# Patient Record
Sex: FEMALE | Race: BLACK OR AFRICAN AMERICAN | Employment: UNEMPLOYED | URBAN - METROPOLITAN AREA
[De-identification: names, ages, dates, MRNs, and addresses within clinical notes are randomized per-mention and may not be internally consistent; named-entity substitution may affect disease eponyms.]

---

## 2017-05-01 ENCOUNTER — HOSPITAL ENCOUNTER (OUTPATIENT)
Age: 47
Setting detail: SPECIMEN
Discharge: HOME | End: 2017-05-01
Payer: COMMERCIAL

## 2017-05-02 LAB
ESTIMATED AVERAGE GLUCOSE: 214 MG/DL
HBA1C MFR BLD: 9.1 % (ref 4–6)

## 2018-07-20 ENCOUNTER — HOSPITAL ENCOUNTER (OUTPATIENT)
Dept: DIABETES SERVICES | Age: 48
Setting detail: THERAPIES SERIES
Discharge: HOME | End: 2018-07-20
Payer: COMMERCIAL

## 2018-07-20 DIAGNOSIS — E11.00 TYPE 2 DIABETES MELLITUS WITH HYPEROSMOLARITY WITHOUT COMA, UNSPECIFIED LONG TERM INSULIN USE STATUS: Primary | ICD-10-CM

## 2018-07-20 PROCEDURE — G0108 DIAB MANAGE TRN  PER INDIV: HCPCS

## 2018-07-20 RX ORDER — DIPHENHYDRAMINE HYDROCHLORIDE 25 MG/1
1 CAPSULE, LIQUID FILLED ORAL 3 TIMES DAILY
COMMUNITY

## 2018-07-20 RX ORDER — LISINOPRIL 5 MG/1
10 TABLET ORAL DAILY
COMMUNITY

## 2018-07-20 RX ORDER — ATORVASTATIN CALCIUM 40 MG/1
40 TABLET, FILM COATED ORAL EVERY EVENING
COMMUNITY

## 2018-07-20 NOTE — PROGRESS NOTES
Diabetes Self- Management Education Program Assessment -   Also see Diabetic Screening  Patient, Lewis Trinh,  here for diabetes self-management education  visit/ assessment. Today's visit was in an individual setting. Diet History :  Diet Questionnaire and typical meal /portion sheet completed  []Yes  [x] NO -- due to time patient did not give diet history    Goals setting:  Goal work sheet completed  [x]Yes  [] NO  (SEE  EDUCATION AND GOALS FLOWSHEET)    MEDICAL HISTORY:  No past medical history on file. No family history on file. Patient has no allergy information on record. There is no immunization history on file for this patient. Current Medications  Current Outpatient Prescriptions   Medication Sig Dispense Refill    insulin lispro (HUMALOG) 100 UNIT/ML injection vial Inject 2-12 Units into the skin 3 times daily (before meals)      insulin glargine (BASAGLAR KWIKPEN) 100 UNIT/ML injection pen Inject 28 Units into the skin nightly      atorvastatin (LIPITOR) 40 MG tablet Take 40 mg by mouth daily      lisinopril (PRINIVIL;ZESTRIL) 5 MG tablet Take 5 mg by mouth daily      aspirin 81 MG tablet Take 81 mg by mouth daily      Blood Glucose Monitoring Suppl (BLOOD GLUCOSE MONITOR SYSTEM) w/Device KIT 1 Device by Does not apply route 3 times daily      metFORMIN (GLUCOPHAGE) 500 MG tablet Take 500 mg by mouth 2 times daily (with meals)       No current facility-administered medications for this encounter.    :     Comments:  Allergies:   Allergies not on file  Diabetes 5  / Health Status    A1C blood level - at goal < 7%   Lab Results   Component Value Date    LABA1C 9.1 (H) 05/01/2017    LABA1C 7.5 (H) 10/10/2016    LABA1C 7.8 (H) 01/11/2016     Lab Results   Component Value Date    LABMICR 10 10/10/2016    CREATININE 0.58 10/10/2016       Blood pressure ( 130/ 80)  Or less  BP Readings from Last 3 Encounters:   No data found for BP        Cholesterol ( LDL under  100)   Lab Results Needs to see eye care and needs to have dental appt    Follows with [facility]    Stated she also has follow up in Aug with Chad Martines of name - female    Developing strategies to address psychosocial issues:  Describe feelings about living with diabetes; Describe how stress, depression & anxiety affect blood glucose; Identify coping strategies; Identify support needed & support network available. 1      Very stressed and feeling overwhelmed    Developing strategies to promote health/change behavior: Identify 7 self-care behaviors; Personal health risk factors; Benefits, challenges & strategies for behavioral change;    1         Individualized goal selection. My goal , to help me improve my health, I will:  Healthy eating  1. Use healthy plate for meal planning     Medications  2. Take insulins as directed        Plan  Follow-up Appointments planned in individual setting. Next Appointment in 2 weeks. Instruction Method: [x]Lecture/Discussion  []Power Point Presentation  []Handouts  []Return Demonstration      Education Materials/Equipment Provided:    [x]Self-Management - Initial assessment - Enrolment in to ADA  Where do I Begin, Living with Type 2 diabetes ADA home support program and handout for no concentrated sweets and diet meal planning basics, handout on diabetes education classes.  -7/20/18 rs -      []Self-Management  Class 1 - [facility] Well with Diabetes Booklet and Healthy i On the Road to better managing your diabetes map handouts    [] Self-Management  Class 2 - Meal Plan and handout for serving sizes, smarter snacking, Ready Set Carb Counting / Plate Method, Nutrient Conversion and International [address] Eating for People with Diabetes and Nutrition in the [employer] - fast facts about fast food    [] Self-Management  Class 3 -  Diabetes ID card,  foot care tips sheet, Healthy I  Continuing Your Journey with Diabetes map handout, Individualized Diabetes report card    [] Self-Management Class 4 - BD Booklet  Sick Day Rules and  Dinning Out Guide , recipe hand outs and tips, diabetes Cookbooks  ( when available)     []Self-Management - 3 month follow - up  AADE booklet Side by Ajith Sim a partnership approach to diabetes self- care, [address] [place] [address] Dr Tobacco Quit line, [facility] Diabetes support program information sheet, [facility] information sheet       []Self-Management  Gestational - RN class -Resource materials provided today include educational self care booklet - \" Gestational Diabetes - A Healthy pregnancy and beyond\"  with SMGB and 3 small meals and 3 small snacks diet plan. SMBG sheets to fax back to [facility] weekly.  [facility] guidelines for SMGB and blood glucose log sheets, Never too early to prevent diabetes handout from [facility]      []Self-Management Gestational - RD class - My Food Plan for Gestational diabetes    []Glucose Meter     []Insulin Kit     []Other      Encounter Type Date Start Time End Time Comments No Show Dates   Assessment 7/20/18 rs      1 30   2 27    [x]In Person  []Telephone    Class 1 - Understanding diabetes         Class 2- Nutrition and diabetes          Class 3 - Preventing Complications         Class 4 -  In depth Nutrition and sick day care        Class 5 - 3 month follow up / goal reassessment        Gestational - RN         Gestational - RD        Individual MNT         Shared Med Appt         Yearly Follow-up        Meter Instrx        Insulin Instrx      []Pen  []Vial & Syringe      DSMS Support Plan:  Follow-up plan:     [] MNT referral request / Appointment     [] Annual update referral request and appointment  after      [x]ADA  Where do I Begin, Living with Type 2 diabetes ADA home support program-7/20/18 rs -     [] Healthy U - Diabetes workshops via [facility]       [] Starting [employer] program /  [employer] [phone]    []  Support Group / Mayra Pinto 6 week

## 2019-09-05 ENCOUNTER — HOSPITAL ENCOUNTER (OUTPATIENT)
Age: 49
Setting detail: SPECIMEN
Discharge: HOME | End: 2019-09-05
Payer: COMMERCIAL

## 2019-09-06 LAB
DIRECT EXAM: ABNORMAL
Lab: ABNORMAL
SPECIMEN DESCRIPTION: ABNORMAL

## 2019-09-10 LAB
HPV SAMPLE: NORMAL
HPV, GENOTYPE 16: NOT DETECTED
HPV, GENOTYPE 18: NOT DETECTED
HPV, HIGH RISK OTHER: NOT DETECTED
HPV, INTERPRETATION: NORMAL
SPECIMEN DESCRIPTION: NORMAL

## 2019-09-16 LAB — CYTOLOGY REPORT: NORMAL

## 2020-10-28 ENCOUNTER — OFFICE VISIT (OUTPATIENT)
Dept: PODIATRY | Age: 50
End: 2020-10-28
Payer: COMMERCIAL

## 2020-10-28 VITALS — RESPIRATION RATE: 16 BRPM | BODY MASS INDEX: 33.91 KG/M2 | TEMPERATURE: 97.3 F | HEIGHT: 66 IN | WEIGHT: 211 LBS

## 2020-10-28 PROCEDURE — G8417 CALC BMI ABV UP PARAM F/U: HCPCS | Performed by: PODIATRIST

## 2020-10-28 PROCEDURE — 2022F DILAT RTA XM EVC RTNOPTHY: CPT | Performed by: PODIATRIST

## 2020-10-28 PROCEDURE — 4004F PT TOBACCO SCREEN RCVD TLK: CPT | Performed by: PODIATRIST

## 2020-10-28 PROCEDURE — G8484 FLU IMMUNIZE NO ADMIN: HCPCS | Performed by: PODIATRIST

## 2020-10-28 PROCEDURE — 99203 OFFICE O/P NEW LOW 30 MIN: CPT | Performed by: PODIATRIST

## 2020-10-28 PROCEDURE — 17110 DESTRUCTION B9 LES UP TO 14: CPT | Performed by: PODIATRIST

## 2020-10-28 PROCEDURE — 11721 DEBRIDE NAIL 6 OR MORE: CPT | Performed by: PODIATRIST

## 2020-10-28 PROCEDURE — G8428 CUR MEDS NOT DOCUMENT: HCPCS | Performed by: PODIATRIST

## 2020-10-28 PROCEDURE — 3017F COLORECTAL CA SCREEN DOC REV: CPT | Performed by: PODIATRIST

## 2020-10-28 PROCEDURE — 3046F HEMOGLOBIN A1C LEVEL >9.0%: CPT | Performed by: PODIATRIST

## 2020-10-28 RX ORDER — AMMONIUM LACTATE 12 G/100G
CREAM TOPICAL
Qty: 1 BOTTLE | Refills: 4 | Status: SHIPPED | OUTPATIENT
Start: 2020-10-28 | End: 2021-01-13 | Stop reason: SDUPTHER

## 2020-10-28 NOTE — PROGRESS NOTES
[facility] PHYSICIANS  [facility]  [address] [place] [address]  Dept: [phone]  Dept Fax: [phone]    NEW PATIENT PROGRESS NOTE  Date of patient's visit: 10/28/2020  Patient's Name:  Zaid Agosto YOB: 1970            Patient Care Team:  Samara Villalobos MD as PCP - General (Internal Medicine)  Migel Bear DPM as Physician (Podiatry)        Chief Complaint   Patient presents with    New Patient    Foot Pain    Diabetes         HPI:   Zaid Agosto is a 44 y.o. female who presents to the office today complaining of painful toes and incresaed thickened nails that she cannot trim herself. Symptoms began 1 year(s) ago. She also relates to increased painful skin lesion to her right foot. Patient relates pain is Present. Pain is rated 10 out of 10 and is described as intermittent. Treatments prior to today's visit include: none. Currently denies F/C/N/V. Pt's primary care physician is Samara Villalobos MD last seen      Allergies   Allergen Reactions    No Known Allergies        No past medical history on file. Prior to Admission medications    Medication Sig Start Date End Date Taking?  Authorizing Provider   metFORMIN (GLUCOPHAGE) 500 MG tablet Take 500 mg by mouth 2 times daily (with meals)    Historical Provider, MD   insulin lispro (HUMALOG) 100 UNIT/ML injection vial Inject 2-12 Units into the skin 3 times daily (before meals)    Historical Provider, MD   insulin glargine (BASAGLAR KWIKPEN) 100 UNIT/ML injection pen Inject 28 Units into the skin nightly    Historical Provider, MD   atorvastatin (LIPITOR) 40 MG tablet Take 40 mg by mouth daily    Historical Provider, MD   lisinopril (PRINIVIL;ZESTRIL) 5 MG tablet Take 5 mg by mouth daily    Historical Provider, MD   aspirin 81 MG tablet Take 81 mg by mouth daily    Historical Provider, MD   Blood Glucose Monitoring Suppl (BLOOD GLUCOSE MONITOR SYSTEM) w/Device KIT 1 Device by Does not apply route 3 times daily    Historical Provider, MD       No past surgical history on file. No family history on file. Social History     Tobacco Use    Smoking status: Current Every Day Smoker     Packs/day: 0.30     Types: Cigarettes     Start date: 7/20/1987    Smokeless tobacco: Never Used   Substance Use Topics    Alcohol use: Yes     Alcohol/week: 4.0 standard drinks     Types: 4 Cans of beer per week       Review of Systems    Review of Systems:   History obtained from chart review and the patient  General ROS: negative for - chills, fatigue, fever, night sweats or weight gain  Constitutional: Negative for chills, diaphoresis, fatigue, fever and unexpected weight change. Musculoskeletal: Positive for arthralgias, gait problem and joint swelling. Neurological ROS: negative for - behavioral changes, confusion, headaches or seizures. Negative for weakness and numbness. Dermatological ROS: negative for - mole changes, rash  Cardiovascular: Negative for leg swelling. Gastrointestinal: Negative for constipation, diarrhea, nausea and vomiting. Lower Extremity Physical Examination:   Vitals:   Vitals:    10/28/20 1328   Temp: 97.3 °F (36.3 °C)     General: AAO x 3 in NAD. Dermatologic Exam:  Soft tissue lesion to the plantar right foot x1 with central core and petechiae. Pain on palpation of lesion. Skin No rashes or nodules noted. .   Skin is thin, with flaky sloughing skin as well as decreased hair growth to the lower leg  Small red hemosiderin deposits seen dorsal foot   Musculoskeletal:     1st MPJ ROM decreased, Bilateral.  Muscle strength 5/5, Bilateral.  Pain present upon palpation of toenails 1-5, Bilateral. decreased medial longitudinal arch, Bilateral.  Ankle ROM decreased,Bilateral.    Dorsally contracted digits present digits 2, Bilateral.     Vascular: DP pulses 1/4 bilateral.  PT pulses 0/4 bilateral.   CFT <5 seconds, Bilateral.  Hair growth absent to the level of the digits, Bilateral. Edema present, Bilateral.  Varicosities absent, Bilateral. Erythema absent, Bilateral    Neurological: Sensation diminshed to light touch to level of digits, Bilateral.  Protective sensation intact 6/10 sites via 5.07/10g [place]-Cynthia Monofilament, Bilateral.  negative Tinel's, Bilateral.  negative Valleix sign, Bilateral.      Integument: Warm, dry, supple, Bilateral.  Open lesion absent, Bilateral.  Interdigital maceration absent to web spaces 4, Bilateral.  Nails 1-5 left and 1-5 right thickened > 3.0 mm, dystrophic and crumbly, discolored with subungual debris. Fissures absent, Bilateral.       Asessment: Patient is a 44 y.o. female with:    Diagnosis Orders   1. Type II diabetes mellitus with peripheral circulatory disorder (HCC)   DIABETES FOOT EXAM    Misc. Devices MISC    33619 - LA DEBRIDEMENT OF NAILS, 6 OR MORE   2. Pain in both lower extremities   DIABETES FOOT EXAM    Misc. Devices MISC    33073 - LA DEBRIDEMENT OF NAILS, 6 OR MORE    16428 - LA DESTRUCTION BENIGN LESIONS UP TO 14   3. Dermatophytosis of nail  66658 - LA DEBRIDEMENT OF NAILS, 6 OR MORE   4. Benign neoplasm of skin of right foot  94747 - LA DESTRUCTION BENIGN LESIONS UP TO 14         Plan: Patient examined and evaluated. Current condition and treatment options discussed in detail. Discussed conservative and surgical options with the patient. Sharp debridement of nails 1-5, loida with nail nipper and drummel. The lesion was partially excised and Pyrogallic acid was applied under occlusion. The patient will leave in place for 24-48 hours and than remove. The patient tolerated the procedure well and without complication. RX: DM shoes with inserts, lachydrin, and penlac. Verbal and written instructions given to patient. Contact office with any questions/problems/concerns. RTC in 2month(s).     10/28/2020    Electronically signed by Marily Mary DPM on 10/28/2020 at 1:32 PM  10/28/2020

## 2021-01-13 ENCOUNTER — OFFICE VISIT (OUTPATIENT)
Dept: PODIATRY | Age: 51
End: 2021-01-13
Payer: COMMERCIAL

## 2021-01-13 VITALS — TEMPERATURE: 97.4 F | WEIGHT: 212 LBS | HEIGHT: 66 IN | RESPIRATION RATE: 16 BRPM | BODY MASS INDEX: 34.07 KG/M2

## 2021-01-13 DIAGNOSIS — B35.1 DERMATOPHYTOSIS OF NAIL: ICD-10-CM

## 2021-01-13 DIAGNOSIS — L60.0 INGROWN NAIL: ICD-10-CM

## 2021-01-13 DIAGNOSIS — M79.604 PAIN IN BOTH LOWER EXTREMITIES: ICD-10-CM

## 2021-01-13 DIAGNOSIS — B35.3 TINEA PEDIS OF BOTH FEET: ICD-10-CM

## 2021-01-13 DIAGNOSIS — M79.605 PAIN IN BOTH LOWER EXTREMITIES: ICD-10-CM

## 2021-01-13 DIAGNOSIS — E11.51 TYPE II DIABETES MELLITUS WITH PERIPHERAL CIRCULATORY DISORDER (HCC): Primary | ICD-10-CM

## 2021-01-13 PROCEDURE — 3017F COLORECTAL CA SCREEN DOC REV: CPT | Performed by: PODIATRIST

## 2021-01-13 PROCEDURE — 4004F PT TOBACCO SCREEN RCVD TLK: CPT | Performed by: PODIATRIST

## 2021-01-13 PROCEDURE — 3046F HEMOGLOBIN A1C LEVEL >9.0%: CPT | Performed by: PODIATRIST

## 2021-01-13 PROCEDURE — G8428 CUR MEDS NOT DOCUMENT: HCPCS | Performed by: PODIATRIST

## 2021-01-13 PROCEDURE — G8484 FLU IMMUNIZE NO ADMIN: HCPCS | Performed by: PODIATRIST

## 2021-01-13 PROCEDURE — 99213 OFFICE O/P EST LOW 20 MIN: CPT | Performed by: PODIATRIST

## 2021-01-13 PROCEDURE — G8417 CALC BMI ABV UP PARAM F/U: HCPCS | Performed by: PODIATRIST

## 2021-01-13 PROCEDURE — 11721 DEBRIDE NAIL 6 OR MORE: CPT | Performed by: PODIATRIST

## 2021-01-13 PROCEDURE — 2022F DILAT RTA XM EVC RTNOPTHY: CPT | Performed by: PODIATRIST

## 2021-01-13 RX ORDER — AMMONIUM LACTATE 12 G/100G
CREAM TOPICAL
Qty: 1 BOTTLE | Refills: 4 | Status: SHIPPED | OUTPATIENT
Start: 2021-01-13

## 2021-01-13 NOTE — PROGRESS NOTES
[facility] PHYSICIANS  [facility]  [address] [place] [address]  Dept: [phone]  Dept Fax: [phone]    DIABETIC PROGRESS NOTE  Date of patient's visit: 1/13/2021  Patient's Name:  Simon Redman YOB: 1970            Patient Care Team:  Bharat Thomson MD as PCP - General (Internal Medicine)  Anant Samayoa DPM as Physician (Podiatry)          Chief Complaint   Patient presents with    Diabetes    Foot Pain    Nail Problem       Subjective:   Simon Redman comes to clinic for Diabetes, Foot Pain, and Nail Problem    she is a diabetic and states that  She checks sugar regularly. Pt currently has complaint of thickened, elongated nails that they cannot manage by themselves. Pt's primary care physician is Bharat Thomson MD last seen 4/28/20   Pt's last blood sugar was  150 . Pt has a new complaint of increased dry itchy skin to loida feet. Also relates to increased painful ingrown nail to right great toe. Pt has tried changing shoes but it has not helped the pain       Lab Results   Component Value Date    LABA1C 9.1 (H) 05/01/2017      Complains of numbness in the feet bilat. No past medical history on file. Allergies   Allergen Reactions    No Known Allergies      Current Outpatient Medications on File Prior to Visit   Medication Sig Dispense Refill    Misc. Devices MISC 1 PAIR OF DIABETIC SHOES (1 LEFT/ 1 RIGHT)  1-3 PAIRS OF INSERTS (LEFT/ RIGHT) 2 Device 0    ammonium lactate (LAC-HYDRIN) 12 % cream Apply topically to feet once daily 1 Bottle 4    ciclopirox (PENLAC) 8 % solution Apply topically nightly. Remove once weekly with alcohol or nail polish remover.  1 Bottle 3    metFORMIN (GLUCOPHAGE) 500 MG tablet Take 500 mg by mouth 2 times daily (with meals)      insulin lispro (HUMALOG) 100 UNIT/ML injection vial Inject 2-12 Units into the skin 3 times daily (before meals)      insulin glargine (BASAGLAR KWIKPEN) 100 UNIT/ML injection pen Inject 28 level of digits, Bilateral.  Protective sensation intact 6/10 sites via 5.07/10g [place]-Cynthia Monofilament, Bilateral.  negative Tinel's, Bilateral.  negative Valleix sign, Bilateral.      Integument: Warm, dry, supple, Bilateral.  Open lesion absent, Bilateral.  Interdigital maceration absent to web spaces 4, Bilateral.  Nails 1-5 left and 1-5 right thickened > 3.0 mm, dystrophic and crumbly, discolored with subungual debris. Fissures absent, Bilateral.   General: AAO x 3 in NAD. Derm  Toenail Description  Sites of Onychomycosis Involvement (Check affected area)  [x] [x] [x] [x] [x] [x] [x] [x] [x] [x]  5 4 3 2 1 1 2 3 4 5                          Right                                        Left    Thickness  [x] [x] [x] [x] [x] [x] [x] [x] [x] [x]  5 4 3 2 1 1 2 3 4 5                         Right                                        Left    Dystrophic Changes   [x] [x] [x] [x] [x] [x] [x] [x] [x] [x]  5 4 3 2 1 1 2 3 4 5                         Right                                        Left    Color   [x] [x] [x] [x] [x] [x] [x] [x] [x] [x]  5 4 3 2 1 1 2 3 4 5                          Right                                        Left    Incurvation/Ingrowin   [] [] [] [] [] [] [] [] [] []  5 4 3 2 1 1 2 3 4 5                         Right                                        Left    Inflammation/Pain   [x] [x] [x] [x] [x] [x] [x] [x] [x] [x]  5 4 3 2 1 1 2 3 4 5                         Right                                        Left        Visual inspection:  Deformity: hammertoe deformity loida feet  amputation: absent  Skin lesions: absent  Edema: right- 2+ pitting edema, left- 2+ pitting edema    Sensory exam:  Monofilament sensation: abnormal - 6/10 via SW 5.07/10g monofilament to the plantar foot bilateral feet    Pulses: abnormal - 1/4 dorsalis pedis pulse and 1/4 Posterior tibial pulse,   Pinprick: Impaired  Proprioception: Impaired  Vibration (128 Hz):  Impaired       DM with PVD [x]Yes    []No      Assessment:  44 y.o. female with:   Diagnosis Orders   1. Type II diabetes mellitus with peripheral circulatory disorder (HCC)  HM DIABETES FOOT EXAM    24222 - ND DEBRIDEMENT OF NAILS, 6 OR MORE   2. Pain in both lower extremities  HM DIABETES FOOT EXAM    65823 - ND DESTRUCTION BENIGN LESIONS UP TO 14    05097 - ND DEBRIDEMENT OF NAILS, 6 OR MORE   3. Dermatophytosis of nail  HM DIABETES FOOT EXAM    17720 - ND DEBRIDEMENT OF NAILS, 6 OR MORE   4. Tinea pedis of both feet     5. Ingrown nail           Q7   []Yes    []No                Q8   [x]Yes    []No                     Q9   []Yes    []No    Plan:   Pt was evaluated and examined. Patient was given personalized discharge instructions. Slant back procedure perform on nail border using nail nipper to patients tolerance. Advised pt to consider nail avulsion at next visit if symptoms persist or worsen. Pt to monitor for increased signs of infection such as erythema, edema and drainage. RX: Lotrisone cream to be applied to treat tinea, loida feet. Nails 1-10 were debrided sharply in length and thickness with a nipper and , without incident. Pt will follow up in 9 weeks or sooner if any problems arise. Diagnosis was discussed with the pt and all of their questions were answered in detail. Proper foot hygiene and care was discussed with the pt. Informed patient on proper diabetic foot care and importance of tight glycemic control. Patient to check feet daily and contact the office with any questions/problems/concerns.    Other comorbidity noted and will be managed by PCP.  1/13/2021    Electronically signed by Dimitrios Mendez DPM on 1/13/2021 at 1:23 PM  1/13/2021

## 2021-03-04 ENCOUNTER — OFFICE VISIT (OUTPATIENT)
Dept: PODIATRY | Age: 51
End: 2021-03-04
Payer: COMMERCIAL

## 2021-03-04 VITALS — TEMPERATURE: 97.3 F | HEIGHT: 64 IN | BODY MASS INDEX: 36.02 KG/M2 | RESPIRATION RATE: 16 BRPM | WEIGHT: 211 LBS

## 2021-03-04 DIAGNOSIS — D23.72 BENIGN NEOPLASM OF SKIN OF LOWER LIMB, INCLUDING HIP, LEFT: ICD-10-CM

## 2021-03-04 DIAGNOSIS — M79.604 PAIN IN BOTH LOWER EXTREMITIES: ICD-10-CM

## 2021-03-04 DIAGNOSIS — B35.1 DERMATOPHYTOSIS OF NAIL: ICD-10-CM

## 2021-03-04 DIAGNOSIS — D23.71 BENIGN NEOPLASM OF SKIN OF LOWER LIMB, INCLUDING HIP, RIGHT: ICD-10-CM

## 2021-03-04 DIAGNOSIS — E11.51 TYPE II DIABETES MELLITUS WITH PERIPHERAL CIRCULATORY DISORDER (HCC): Primary | ICD-10-CM

## 2021-03-04 DIAGNOSIS — M79.605 PAIN IN BOTH LOWER EXTREMITIES: ICD-10-CM

## 2021-03-04 PROCEDURE — 11721 DEBRIDE NAIL 6 OR MORE: CPT | Performed by: PODIATRIST

## 2021-03-04 PROCEDURE — 17110 DESTRUCTION B9 LES UP TO 14: CPT | Performed by: PODIATRIST

## 2021-03-04 RX ORDER — IBUPROFEN 800 MG/1
TABLET ORAL
COMMUNITY
Start: 2021-02-11

## 2021-03-04 RX ORDER — CALCIUM CITRATE/VITAMIN D3 200MG-6.25
TABLET ORAL
COMMUNITY
Start: 2021-03-02

## 2021-03-04 RX ORDER — ASPIRIN 81 MG/1
TABLET ORAL
COMMUNITY
Start: 2021-02-11

## 2021-03-04 RX ORDER — ESTRADIOL AND NORETHINDRONE ACETATE .5; .1 MG/1; MG/1
TABLET ORAL
COMMUNITY
Start: 2021-02-11

## 2021-03-04 RX ORDER — CHOLECALCIFEROL (VITAMIN D3) 1250 MCG
CAPSULE ORAL
COMMUNITY
Start: 2021-02-25

## 2021-03-04 RX ORDER — DULAGLUTIDE 0.75 MG/.5ML
0.75 INJECTION, SOLUTION SUBCUTANEOUS WEEKLY
COMMUNITY

## 2021-03-04 NOTE — PROGRESS NOTES
[facility] PHYSICIANS  [facility]  [address]  Dept: [phone]  Dept Fax: [phone]     FOOT PAIN & BENIGN NEOPLASM PROGRESS NOTE  Date of patient's visit: 3/4/2021  Patient's Name:  Saulo Rdz YOB: 1970            Patient Care Team:  Bertha Grady MD as PCP - General (Internal Medicine)  Dimitrios Mendez DPM as Physician (Podiatry)  Marlin Mendoza DPM as Physician (Podiatry)          Chief Complaint   Patient presents with    Diabetes    Peripheral Neuropathy    Foot Pain    Benign Neoplasm    Toe Pain    Wound Check       Subjective: This Saulo Rdz comes to clinic for foot and nail care. Pt currently has complaint of thickened, painful, elongated nails that he/she cannot manage by themselves. Pt. Relates pain to nails with shoe gear. Pt's primary care physician is [address] [place] [address], [address] [place] [address] seen 1/24/2021. No past medical history on file. Saulo Rdz is a 44 y.o. female. Painful lesions her   feet. . They have been present for 2 month(s) duration. The lesions are present on the bilateral foot. The patient has 2 lesion that is deep seated and has a painful core. Treatment has included pads and previous podiatry treatment  Pt has a new complaint of NONE.      Allergies   Allergen Reactions    No Known Allergies      Current Outpatient Medications on File Prior to Visit   Medication Sig Dispense Refill    ASPIRIN ADULT LOW STRENGTH 81 MG EC tablet TAKE 1 TABLET BY MOUTH ONCE DAILY      Cholecalciferol (VITAMIN D3) 1.25 MG (80766 UT) CAPS TAKE 1 CAPSULE BY MOUTH ONCE A WEEK AFTER A MEAL      Dulaglutide (TRULICITY) 1.87 CP/2.6IP SOPN 0.75 mg      Estradiol-Norethindrone Acet 0.5-0.1 MG TABS TAKE 1 TABLET BY MOUTH ONCE DAILY      TRUE METRIX BLOOD GLUCOSE TEST strip USE 1 STRIP TO CHECK GLUCOSE 4 TIMES DAILY AS DIRECTED      ibuprofen (ADVIL;MOTRIN) 800 MG tablet TAKE 1 TABLET BY MOUTH ONCE DAILY AS NEEDED      triamcinolone (KENALOG) 0.1 % ointment APPLY OINTMENT TOPICALLY TO AFFECTED AREA TWICE DAILY AS NEEDED      ciclopirox (PENLAC) 8 % solution Apply topically nightly. Remove once weekly with alcohol or nail polish remover. 1 Bottle 3    ammonium lactate (LAC-HYDRIN) 12 % cream Apply topically to feet once daily 1 Bottle 4    Misc. Devices MISC 1 PAIR OF DIABETIC SHOES (1 LEFT/ 1 RIGHT)  1-3 PAIRS OF INSERTS (LEFT/ RIGHT) 2 Device 0    metFORMIN (GLUCOPHAGE) 500 MG tablet Take 500 mg by mouth 2 times daily (with meals)      insulin lispro (HUMALOG) 100 UNIT/ML injection vial Inject 2-12 Units into the skin 3 times daily (before meals)      insulin glargine (BASAGLAR KWIKPEN) 100 UNIT/ML injection pen Inject 28 Units into the skin nightly      atorvastatin (LIPITOR) 40 MG tablet Take 40 mg by mouth daily      lisinopril (PRINIVIL;ZESTRIL) 5 MG tablet Take 5 mg by mouth daily      aspirin 81 MG tablet Take 81 mg by mouth daily      Blood Glucose Monitoring Suppl (BLOOD GLUCOSE MONITOR SYSTEM) w/Device KIT 1 Device by Does not apply route 3 times daily       No current facility-administered medications on file prior to visit. Review of Systems    Review of Systems:   History obtained from chart review and the patient  General ROS: negative for - chills, fatigue, fever, night sweats or weight gain  Constitutional: Negative for chills, diaphoresis, fatigue, fever and unexpected weight change. Musculoskeletal: Positive for arthralgias, gait problem and joint swelling. Neurological ROS: negative for - behavioral changes, confusion, headaches or seizures. Negative for weakness and numbness. Dermatological ROS: negative for - mole changes, rash  Cardiovascular: Negative for leg swelling. Gastrointestinal: Negative for constipation, diarrhea, nausea and vomiting.             Objective:  Dermatologic Exam:  Skin lesion present to the right and left plantar foot with a central core and petchaie noted to the lesions Incurvation/Ingrowin   [] [] [] [] [] [] [] [] [] []  5 4 3 2 1 1 2 3 4 5                         Right                                        Left    Inflammation/Pain   [x] [x] [x] [x] [x] [x] [x] [x] [x] [x]  5 4 3  2 1 1 2 3 4 5                         Right                                        Left       Nails are painful 1-10 with direct palpation. Q7   []Yes  []No                Q8   [x]Yes  []No                     Q9   []Yes    []No    Assessment:  44 y.o. female with:    Diagnosis Orders   1. Type II diabetes mellitus with peripheral circulatory disorder (HCC)   DIABETES FOOT EXAM    66987 - CT DEBRIDEMENT OF NAILS, 6 OR MORE    01982 - CT DESTRUCTION BENIGN LESIONS UP TO 14   2. Pain in both lower extremities   DIABETES FOOT EXAM    85471 - CT DEBRIDEMENT OF NAILS, 6 OR MORE    78325 - CT DESTRUCTION BENIGN LESIONS UP TO 14   3. Dermatophytosis of nail   DIABETES FOOT EXAM    08045 - CT DEBRIDEMENT OF NAILS, 6 OR MORE   4. Benign neoplasm of skin of lower limb, including hip, right   DIABETES FOOT EXAM    54053 - CT DESTRUCTION BENIGN LESIONS UP TO 14   5. Benign neoplasm of skin of lower limb, including hip, left  HM DIABETES FOOT EXAM    98280 - CT DESTRUCTION BENIGN LESIONS UP TO 14       Plan:   Pt was evaluated and examined. Patient was given personalized discharge instructions. Nails 1-10 were debrided in length and thickness sharply with a nail nipper and  without incident. Pt will follow up in 9 weeks or sooner if any problems arise. Diagnosis was discussed with the pt and all of their questions were answered in detail. Proper foot hygiene and care was discussed with the pt. Patient to check feet daily and contact the office with any questions/problems/concerns. Other comorbidity noted and will be managed by PCP. Pain waiver discussed with patient and confirmed.    3/4/2021    The lesion was partially debrided and silver nitrate was applied with an apperature pad under

## 2021-03-17 ENCOUNTER — OFFICE VISIT (OUTPATIENT)
Dept: PODIATRY | Age: 51
End: 2021-03-17
Payer: COMMERCIAL

## 2021-03-17 VITALS — BODY MASS INDEX: 36.02 KG/M2 | WEIGHT: 211 LBS | HEIGHT: 64 IN | TEMPERATURE: 97.4 F | RESPIRATION RATE: 16 BRPM

## 2021-03-17 DIAGNOSIS — B35.1 DERMATOPHYTOSIS OF NAIL: ICD-10-CM

## 2021-03-17 DIAGNOSIS — E11.51 TYPE II DIABETES MELLITUS WITH PERIPHERAL CIRCULATORY DISORDER (HCC): Primary | ICD-10-CM

## 2021-03-17 DIAGNOSIS — M21.611 BUNION, RIGHT: ICD-10-CM

## 2021-03-17 DIAGNOSIS — M79.604 PAIN IN BOTH LOWER EXTREMITIES: ICD-10-CM

## 2021-03-17 DIAGNOSIS — M79.605 PAIN IN BOTH LOWER EXTREMITIES: ICD-10-CM

## 2021-03-17 PROCEDURE — 11721 DEBRIDE NAIL 6 OR MORE: CPT | Performed by: PODIATRIST

## 2021-03-17 PROCEDURE — 3046F HEMOGLOBIN A1C LEVEL >9.0%: CPT | Performed by: PODIATRIST

## 2021-03-17 PROCEDURE — 3017F COLORECTAL CA SCREEN DOC REV: CPT | Performed by: PODIATRIST

## 2021-03-17 PROCEDURE — 99214 OFFICE O/P EST MOD 30 MIN: CPT | Performed by: PODIATRIST

## 2021-03-17 PROCEDURE — 2022F DILAT RTA XM EVC RTNOPTHY: CPT | Performed by: PODIATRIST

## 2021-03-17 PROCEDURE — G8427 DOCREV CUR MEDS BY ELIG CLIN: HCPCS | Performed by: PODIATRIST

## 2021-03-17 PROCEDURE — 4004F PT TOBACCO SCREEN RCVD TLK: CPT | Performed by: PODIATRIST

## 2021-03-17 PROCEDURE — G8484 FLU IMMUNIZE NO ADMIN: HCPCS | Performed by: PODIATRIST

## 2021-03-17 PROCEDURE — G8417 CALC BMI ABV UP PARAM F/U: HCPCS | Performed by: PODIATRIST

## 2021-03-17 NOTE — PROGRESS NOTES
[facility] PHYSICIANS  [facility]  [address] [place] [address]  Dept: [phone]  Dept Fax: [phone]    DIABETIC PROGRESS NOTE  Date of patient's visit: 3/17/2021  Patient's Name:  Latisha Cheney YOB: 1970            Patient Care Team:  Mae Alvarado MD as PCP - General (Internal Medicine)  Emmie Faulkner DPM as Physician (Podiatry)  Melissa Christian DPM as Physician (Podiatry)          Chief Complaint   Patient presents with    Diabetes    Foot Pain    Nail Problem       Subjective:   Latisha Cheney comes to clinic for Diabetes, Foot Pain, and Nail Problem    she is a diabetic and states that she is doing well. Pt currently has complaint of thickened, elongated nails that they cannot manage by themselves. Pt's primary care physician is Mae Alvarado MD last seen 01/24/2021. Pt's last blood sugar was 142 . Pt has a new complaint of  Increase pain to right foot bunion. Pt has tried changing shoes but it has not helped the pain. Lab Results   Component Value Date    LABA1C 9.1 (H) 05/01/2017      Complains of numbness in the feet bilat. No past medical history on file.     Allergies   Allergen Reactions    No Known Allergies      Current Outpatient Medications on File Prior to Visit   Medication Sig Dispense Refill    ASPIRIN ADULT LOW STRENGTH 81 MG EC tablet TAKE 1 TABLET BY MOUTH ONCE DAILY      Cholecalciferol (VITAMIN D3) 1.25 MG (92074 UT) CAPS TAKE 1 CAPSULE BY MOUTH ONCE A WEEK AFTER A MEAL      Dulaglutide (TRULICITY) 1.29 LF/0.1WV SOPN 0.75 mg      Estradiol-Norethindrone Acet 0.5-0.1 MG TABS TAKE 1 TABLET BY MOUTH ONCE DAILY      TRUE METRIX BLOOD GLUCOSE TEST strip USE 1 STRIP TO CHECK GLUCOSE 4 TIMES DAILY AS DIRECTED      ibuprofen (ADVIL;MOTRIN) 800 MG tablet TAKE 1 TABLET BY MOUTH ONCE DAILY AS NEEDED      triamcinolone (KENALOG) 0.1 % ointment APPLY OINTMENT TOPICALLY TO AFFECTED AREA TWICE DAILY AS NEEDED      ciclopirox (PENLAC) 8 % solution Apply topically nightly. Remove once weekly with alcohol or nail polish remover. 1 Bottle 3    ammonium lactate (LAC-HYDRIN) 12 % cream Apply topically to feet once daily 1 Bottle 4    Misc. Devices MISC 1 PAIR OF DIABETIC SHOES (1 LEFT/ 1 RIGHT)  1-3 PAIRS OF INSERTS (LEFT/ RIGHT) 2 Device 0    metFORMIN (GLUCOPHAGE) 500 MG tablet Take 500 mg by mouth 2 times daily (with meals)      insulin lispro (HUMALOG) 100 UNIT/ML injection vial Inject 2-12 Units into the skin 3 times daily (before meals)      insulin glargine (BASAGLAR KWIKPEN) 100 UNIT/ML injection pen Inject 28 Units into the skin nightly      atorvastatin (LIPITOR) 40 MG tablet Take 40 mg by mouth daily      lisinopril (PRINIVIL;ZESTRIL) 5 MG tablet Take 5 mg by mouth daily      aspirin 81 MG tablet Take 81 mg by mouth daily      Blood Glucose Monitoring Suppl (BLOOD GLUCOSE MONITOR SYSTEM) w/Device KIT 1 Device by Does not apply route 3 times daily       No current facility-administered medications on file prior to visit. Review of Systems    Review of Systems:   History obtained from chart review and the patient  General ROS: negative for - chills, fatigue, fever, night sweats or weight gain  Constitutional: Negative for chills, diaphoresis, fatigue, fever and unexpected weight change. Musculoskeletal: Positive for arthralgias, gait problem and joint swelling. Neurological ROS: negative for - behavioral changes, confusion, headaches or seizures. Negative for weakness and numbness. Dermatological ROS: negative for - mole changes, rash  Cardiovascular: Negative for leg swelling. Gastrointestinal: Negative for constipation, diarrhea, nausea and vomiting. Objective:  Dermatologic Exam:  Skin lesion/ulceration Absent . Skin No rashes or nodules noted. .   Skin is thin, with flaky sloughing skin as well as decreased hair growth to the lower leg  Small red hemosiderin deposits seen dorsal foot Musculoskeletal:     1st MPJ ROM decreased, Bilateral. Increased jenna prominence medial 1st MPJ with lateral hallux deviation, right. Muscle strength 5/5, Bilateral.  Pain present upon palpation of toenails 1-5, Bilateral. decreased medial longitudinal arch, Bilateral.  Ankle ROM decreased,Bilateral.    Dorsally contracted digits present digits 2, Bilateral.     Vascular: DP pulses 1/4 bilateral.  PT pulses 0/4 bilateral.   CFT <5 seconds, Bilateral.  Hair growth absent to the level of the digits, Bilateral.  Edema present, Bilateral.  Varicosities absent, Bilateral. Erythema absent, Bilateral    Neurological: Sensation diminshed to light touch to level of digits, Bilateral.  Protective sensation intact 6/10 sites via 5.07/10g [place]thia Monofilament, Bilateral.  negative Tinel's, Bilateral.  negative Valleix sign, Bilateral.      Integument: Warm, dry, supple, Bilateral.  Open lesion absent, Bilateral.  Interdigital maceration absent to web spaces 4, Bilateral.  Nails 1-5 left and 1-5 right thickened > 3.0 mm, dystrophic and crumbly, discolored with subungual debris. Fissures absent, Bilateral.   General: AAO x 3 in NAD.       Xrays, 3 views, right foot:  Increased jenna prominence medial 1st MPJ with lateral hallux deviation, right    Derm  Toenail Description  Sites of Onychomycosis Involvement (Check affected area)  [x] [x] [x] [x] [x] [x] [x] [x] [x] [x]  5 4 3 2 1 1 2 3 4 5                          Right                                        Left    Thickness  [x] [x] [x] [x] [x] [x] [x] [x] [x] [x]  5 4 3 2 1 1 2 3 4 5                         Right                                        Left    Dystrophic Changes   [x] [x] [x] [x] [x] [x] [x] [x] [x] [x]  5 4 3 2 1 1 2 3 4 5                         Right                                        Left    Color   [x] [x] [x] [x] [x] [x] [x] [x] [x] [x]  5 4 3 2 1 1 2 3 4 5                          Right                                        Left Incurvation/Ingrowin   [] [] [] [] [] [] [] [] [] []  5 4 3 2 1 1 2 3 4 5                         Right                                        Left    Inflammation/Pain   [x] [x] [x] [x] [x] [x] [x] [x] [x] [x]  5 4 3 2 1 1 2 3 4 5                         Right                                        Left        Visual inspection:  Deformity: hammertoe deformity loida feet  amputation: absent  Skin lesions: absent  Edema: right- 2+ pitting edema, left- 2+ pitting edema    Sensory exam:  Monofilament sensation: abnormal - 6/10 via SW 5.07/10g monofilament to the plantar foot bilateral feet    Pulses: abnormal - 1/4 dorsalis pedis pulse and 1/4 Posterior tibial pulse,   Pinprick: Impaired  Proprioception: Impaired  Vibration (128 Hz): Impaired       DM with PVD       [x]Yes    []No      Assessment:  44 y.o. female with:   Diagnosis Orders   1. Type II diabetes mellitus with peripheral circulatory disorder (HCC)  HM DIABETES FOOT EXAM    93708 - PA DEBRIDEMENT OF NAILS, 6 OR MORE   2. Pain in both lower extremities  HM DIABETES FOOT EXAM    00841 - PA DEBRIDEMENT OF NAILS, 6 OR MORE   3. Dermatophytosis of nail  HM DIABETES FOOT EXAM    99787 - PA DEBRIDEMENT OF NAILS, 6 OR MORE   4. Bunion, right  42036 - PA DEBRIDEMENT OF NAILS, 6 OR MORE    XR FOOT RIGHT (MIN 3 VIEWS)           Q7   []Yes    []No                Q8   [x]Yes    []No                     Q9   []Yes    []No    Plan:   Pt was evaluated and examined. Patient was given personalized discharge instructions. Nails 1-10 were debrided sharply in length and thickness with a nipper and , without incident. All labs were reviewed and all imagining including the above findings were reviewed prior to the patients arrival and with the patient today      Time was spent educating the patient and their families/caregivers on proper care of the feet and ankles. All the above diagnosis were addressed at todays visit and all questions were answered.       I had a long discussion today with the patient about the likely diagnosis and its natural history, physical exam and imaging findings, as well as treatment options. We discussed both surgical and nonsurgical treatments, including risks and benefits. From a nonoperative standpoint, we discussed rest/activity modification, NSAIDs/Acetaminophen/topical anesthetics, orthotics, bracing/immobilization, and physical therapy. Surgically, we recommend right foot modified mccurdy      I discussed in detail the procedure. He/She was in full agreement and understood risks. The reason for surgery is due to unsuccessful non-operative treatment and/or conservative treatment is not a viable option. It was discussed with the patient that compliance postoperatively is of utmost importance. Any deviation on behalf of the patient will decrease the chances of a successful outcome. Patient acknowledged, understands, and would like to move forward with surgery as discussed. The patient was given a consent outlining the general risk of surgery as well as the specifics to the surgical plan. This was carefully discussed giving all options, indications and contraindications regarding the procedure outlined in the consent. All questions were answered to the patient's satisfaction. The patient signed the consent form confirming complete understanding and acceptance of the risks of stated. I specifically stated and inquired if the patient understands and accepts the risks of surgery including infection, failure, prolonged pain, swelling, numbness, recurrence, limited mobility,painful scar, RSDS, overcorrection, under-correction, and loss of limb/life. Death, bleeding, blood clots in the veins or lungs, tendon or blood vessel disturbance, bony conditions, continued pain,stiffness, weakness, and limited function. These were all listed on the consent. Additionally, the postoperative course and treatment was outlined for the patient.  Discussion consisted of postoperatively the patient needs to keep the foot elevated for at least the first initial two weeks. I have encouraged movements such as moving from the bed to the sofa or recliner, to the kitchen and the bathroom; quick bursts of movement with the foot elevated. Longstanding periods of time such as cooking, cleaning, and shopping are not permitted. I reminded the patient that there are only two reasons to have surgery. That being, if their function is impaired and also if they are having pain. If they can answer yes to both these questions, I will move forward with surgery. If they can not, there is no reason to proceed with surgical intervention. Pt does elect to have the procedure above    A total of 35 minutes was spent with this patients encounter    Pt will follow up in 1 weeks s/p surgery or sooner if any problems arise. Diagnosis was discussed with the pt and all of their questions were answered in detail. Proper foot hygiene and care was discussed with the pt. Informed patient on proper diabetic foot care and importance of tight glycemic control. Patient to check feet daily and contact the office with any questions/problems/concerns.    Other comorbidity noted and will be managed by PCP.  3/17/2021    Electronically signed by Anton Bernal DPM on 3/17/2021 at 1:36 PM  3/17/2021

## 2021-04-20 ENCOUNTER — HOSPITAL ENCOUNTER (OUTPATIENT)
Dept: PREADMISSION TESTING | Age: 51
Discharge: HOME | End: 2021-04-24
Payer: COMMERCIAL

## 2021-04-20 VITALS
TEMPERATURE: 96.3 F | HEIGHT: 63 IN | HEART RATE: 92 BPM | BODY MASS INDEX: 36.21 KG/M2 | OXYGEN SATURATION: 99 % | WEIGHT: 204.4 LBS | RESPIRATION RATE: 14 BRPM | SYSTOLIC BLOOD PRESSURE: 138 MMHG | DIASTOLIC BLOOD PRESSURE: 71 MMHG

## 2021-04-20 LAB
ABSOLUTE EOS #: 0.17 K/UL (ref 0–0.44)
ABSOLUTE IMMATURE GRANULOCYTE: 0.05 K/UL (ref 0–0.3)
ABSOLUTE LYMPH #: 2.83 K/UL (ref 1.1–3.7)
ABSOLUTE MONO #: 0.78 K/UL (ref 0.1–1.2)
ANION GAP SERPL CALCULATED.3IONS-SCNC: 15 MMOL/L (ref 9–17)
BASOPHILS # BLD: 1 % (ref 0–2)
BASOPHILS ABSOLUTE: 0.06 K/UL (ref 0–0.2)
BUN BLDV-MCNC: 16 MG/DL (ref 6–20)
BUN/CREAT BLD: 25 (ref 9–20)
CALCIUM SERPL-MCNC: 9.7 MG/DL (ref 8.6–10.4)
CHLORIDE BLD-SCNC: 107 MMOL/L (ref 98–107)
CO2: 21 MMOL/L (ref 20–31)
CREAT SERPL-MCNC: 0.63 MG/DL (ref 0.5–0.9)
DIFFERENTIAL TYPE: ABNORMAL
EOSINOPHILS RELATIVE PERCENT: 1 % (ref 1–4)
GFR AFRICAN AMERICAN: >60 ML/MIN
GFR NON-AFRICAN AMERICAN: >60 ML/MIN
GFR SERPL CREATININE-BSD FRML MDRD: ABNORMAL ML/MIN/{1.73_M2}
GFR SERPL CREATININE-BSD FRML MDRD: ABNORMAL ML/MIN/{1.73_M2}
GLUCOSE BLD-MCNC: 67 MG/DL (ref 70–99)
HCT VFR BLD CALC: 36.1 % (ref 36.3–47.1)
HEMOGLOBIN: 11.8 G/DL (ref 11.9–15.1)
IMMATURE GRANULOCYTES: 0 %
LYMPHOCYTES # BLD: 23 % (ref 24–43)
MCH RBC QN AUTO: 27.6 PG (ref 25.2–33.5)
MCHC RBC AUTO-ENTMCNC: 32.7 G/DL (ref 28.4–34.8)
MCV RBC AUTO: 84.5 FL (ref 82.6–102.9)
MONOCYTES # BLD: 6 % (ref 3–12)
NRBC AUTOMATED: 0 PER 100 WBC
PDW BLD-RTO: 13.9 % (ref 11.8–14.4)
PLATELET # BLD: 485 K/UL (ref 138–453)
PLATELET ESTIMATE: ABNORMAL
PMV BLD AUTO: 8.8 FL (ref 8.1–13.5)
POTASSIUM SERPL-SCNC: 3.5 MMOL/L (ref 3.7–5.3)
RBC # BLD: 4.27 M/UL (ref 3.95–5.11)
RBC # BLD: ABNORMAL 10*6/UL
SEG NEUTROPHILS: 69 % (ref 36–65)
SEGMENTED NEUTROPHILS ABSOLUTE COUNT: 8.24 K/UL (ref 1.5–8.1)
SODIUM BLD-SCNC: 143 MMOL/L (ref 135–144)
WBC # BLD: 12.1 K/UL (ref 3.5–11.3)
WBC # BLD: ABNORMAL 10*3/UL

## 2021-04-20 PROCEDURE — 83036 HEMOGLOBIN GLYCOSYLATED A1C: CPT

## 2021-04-20 PROCEDURE — 93005 ELECTROCARDIOGRAM TRACING: CPT | Performed by: ANESTHESIOLOGY

## 2021-04-20 PROCEDURE — 80048 BASIC METABOLIC PNL TOTAL CA: CPT

## 2021-04-20 PROCEDURE — 85025 COMPLETE CBC W/AUTO DIFF WBC: CPT

## 2021-04-20 PROCEDURE — 36415 COLL VENOUS BLD VENIPUNCTURE: CPT

## 2021-04-20 NOTE — PROGRESS NOTES
to be used by people allergic to Chlorhexidine Gluconate (CHG). Following these instructions will help you be sure that your skin is clean before surgery. Instructions on cleaning your skin before surgery: The night before your surgery:      You will need to shower with warm water (not hot) and the CHG soap.  Use a clean wash cloth and a clean towel. Have clean clothes available to put on after the shower.   First wash your hair with regular shampoo. Rinse your hair and body thoroughly to remove the shampoo. Kiran Rivero Wash your face with your regular soap or water only. Thoroughly rinse your body with warm water from the neck down.  Turn water off to prevent rinsing the soap off too soon.  With a clean wet washcloth and half of the CHG soap in the bottle, lather your entire body from the neck down. Do not use CHG soap near your eyes or ears to avoid injury to those areas.  Wash thoroughly, paying special attention to the area where your surgery will be performed.  Wash your body gently for five (5) minutes. Avoid scrubbing your skin too hard.  Turn the water back on and rinse your body thoroughly.  Pat yourself dry with a clean, soft towel. Do not apply lotion, cream or powder.  Dress with clean freshly washed clothes. The morning of surgery:     Repeat shower following steps above - using remaining half of CHG soap in bottle. Patient Instructions:    Kiran Rivero If you are having any type of anesthesia you are to have nothing to eat or drink after midnight the night before your surgery. This includes gum, mints, water or smoking or chewing tobacco.  The only exception to this is a small sip of water to take with any morning dose of heart, blood pressure, or seizure medications. No alcoholic beverages for 24 hours prior to surgery.  Brush your teeth but do not swallow water.  Bring your eyeglasses and case with you.   NO contacts are to be worn the day of surgery. You also may bring your hearing aids. Most surgical procedures involving anesthesia will require that you remove your dentures prior to surgery. · Do NOT wear any jewelry or body piercings day of surgery. Also, NO lotion, perfume or deodorant to be used the day of surgery. NO nail polish on the operative extremity (arm/leg surgeries)    · Do not bring any valuables such as jewelry, cash, or credit cards. If you are staying overnight with us, please bring a small bag of personal items.  Please wear loose, comfortable clothing. If you are potentially going to have a cast or brace bring clothing that will fit over them.  In case of illness - If you have cold or flu like symptoms (high fever, runny nose, sore throat, cough, etc.) rash, nausea, vomiting, loose stools, and/or recent contact with someone who has a contagious disease (chicken pox, measles, etc.) Please call your doctor before coming to the hospital.       Day of Surgery/Procedure:    As a patient at [facility] - INPATIENT you can expect quality medical and nursing care that is centered on your individual needs. Our goal is to make your surgical experience as comfortable as possible. .  Transportation After Your Surgery/Procedure: You will need a friend or family member to drive you home after your procedure. Your  must be 25years of age or older and able to sign off on your discharge instructions. A taxi cab or any other form of public transportation is not acceptable. Your friend or family member may stay at the hospital throughout your procedure or they may leave as long as phone number is confirmed by a staff member. Someone must remain with you for the first 24 hours after your surgery if you receive anesthesia or medication. If you do not have someone to stay with you, your procedure may be cancelled.       If you have any other questions regarding your procedure or the day of surgery, please call the PAT department at [phone].      _________________________  ____________________________  Signature (Patient)              Signature (Provider) & date

## 2021-04-20 NOTE — H&P
History and Physical Service   [facility] [place] - [facility]PATIENT    HISTORY AND PHYSICAL EXAMINATION            Date of Evaluation: 4/20/2021  Patient name:  Cece Friedman  MRN:   [id number]  YOB: 1970  PCP:    Ryder Dale MD    History Obtained From:     Patient, Medical records    History of Present Illness: This is Cece Friedman a 46 y.o. female who presents for a pre-admission testing appointment for an upcoming right foot modified [place] bunionectomy by Dr. Seven Cosme scheduled on 05/07/2021 at 0830 due to right bunion. The patient's chief complaint is 8-9/10 right foot pain which has progressively worsened over the past 2 years. The pain is aggravated by walking and by wearing shoes that rub on the right bunion. Pt states the right foot pain improves when she wears house shoes. Pt denies right foot edema or wounds. Functional Capacity per pt:   1) Pt is not able to walk 2 city blocks on level ground without SOB. Pt states she has to rest to catch her breath while doing household chores. 2) Pt is not able to climb 2 flights of stairs without SOB. Past Medical History:     Past Medical History:   Diagnosis Date    Diabetes mellitus ([address]     Eczema     History of blood transfusion 2003    After hysterectomy    Hyperlipidemia     Hypertension     PONV (postoperative nausea and vomiting)     Sleep apnea     C-PAP        Past Surgical History:     Past Surgical History:   Procedure Laterality Date    COLONOSCOPY      FINGER SURGERY  2019    Cyst removed from left thumb    HYSTERECTOMY          Medications Prior to Admission:     Prior to Admission medications    Medication Sig Start Date End Date Taking?  Authorizing Provider   insulin glargine (LANTUS SOLOSTAR) 100 UNIT/ML injection Inject 10 Units into the skin every morning   Yes Historical Provider, MD   ASPIRIN ADULT LOW STRENGTH 81 MG EC tablet TAKE 1 TABLET BY MOUTH ONCE DAILY 2/11/21   Historical Provider, MD day. She has never used smokeless tobacco.  Alcohol:      reports current alcohol use of about 4.0 standard drinks of alcohol per week. Drug Use:  reports no history of drug use. Family History:     History reviewed. No pertinent family history. Review of Systems:     Positive and Negative as described in HPI. CONSTITUTIONAL: Hot flashes. Negative for fevers, chills, fatigue, and weight loss. HEENT: Pt wears glasses. Negative for hearing changes, rhinorrhea, and throat pain. RESPIRATORY: Dyspnea with exertion. Negative for cough, congestion, and wheezing. Pt denies history of COPD and asthma. CARDIOVASCULAR: Negative for chest pain, blood clot, irregular heartbeat, and palpitations. GASTROINTESTINAL: Negative for reflux, nausea, vomiting, diarrhea, constipation, change in bowel habits, and abdominal pain. GENITOURINARY: Frequency. Negative for difficulty of urination, hematuria, burning with urination, and urgency. INTEGUMENT: Eczema on bilateral shins. Negative for wounds and easy bruising. HEMATOLOGIC/LYMPHATIC: Negative for swelling/edema. ALLERGIC/IMMUNOLOGIC:  Negative for urticaria. ENDOCRINE: Diabetes. Pt follows-up with Dr. Dick Page from endocrinology. Pt states her last A1C was 8.1%. Pt's blood glucose was 67 mg/dL in [facility]. She denied hypoglycemic symptoms. Pt drank apple juice and ate crackers with peanut butter while in PAT. Pt states she has hypoglycemic episodes twice per week while she is at work. Heat intolerance. Negative for cold intolerance. MUSCULOSKELETAL: Right foot pain. NEUROLOGICAL: Negative for headaches, dizziness, lightheadedness, numbness, and tingling extremities. Pt denies history of strokes and seizures. BEHAVIOR/PSYCH: Negative for depression and anxiety. Physical Exam:   /71   Pulse 92   Temp 96.3 °F (35.7 °C) (Temporal)   Resp 14   Ht 5' 3\" (1.6 m)   Wt 204 lb 6.4 oz (92.7 kg)   SpO2 99%   BMI 36.21 kg/m²   No LMP recorded.  Patient mmol/L    GFR Non-African American >60 >60 mL/min    GFR African American >60 >60 mL/min    GFR Comment          GFR Staging NOT REPORTED    CBC Auto Differential    Collection Time: 21  3:12 PM   Result Value Ref Range    WBC 12.1 (H) 3.5 - 11.3 k/uL    RBC 4.27 3.95 - 5.11 m/uL    Hemoglobin 11.8 (L) 11.9 - 15.1 g/dL    Hematocrit 36.1 (L) 36.3 - 47.1 %    MCV 84.5 82.6 - 102.9 fL    MCH 27.6 25.2 - 33.5 pg    MCHC 32.7 28.4 - 34.8 g/dL    RDW 13.9 11.8 - 14.4 %    Platelets 198 (H) 842 - 453 k/uL    MPV 8.8 8.1 - 13.5 fL    NRBC Automated 0.0 0.0 per 100 WBC    Differential Type NOT REPORTED     Seg Neutrophils 69 (H) 36 - 65 %    Lymphocytes 23 (L) 24 - 43 %    Monocytes 6 3 - 12 %    Eosinophils % 1 1 - 4 %    Basophils 1 0 - 2 %    Immature Granulocytes 0 0 %    Segs Absolute 8.24 (H) 1.50 - 8.10 k/uL    Absolute Lymph # 2.83 1.10 - 3.70 k/uL    Absolute Mono # 0.78 0.10 - 1.20 k/uL    Absolute Eos # 0.17 0.00 - 0.44 k/uL    Basophils Absolute 0.06 0.00 - 0.20 k/uL    Absolute Immature Granulocyte 0.05 0.00 - 0.30 k/uL    WBC Morphology NOT REPORTED     RBC Morphology NOT REPORTED     Platelet Estimate NOT REPORTED        Recent Labs     21  1512   HGB 11.8*   HCT 36.1*   WBC 12.1*   MCV 84.5      K 3.5*      CO2 21   BUN 16   CREATININE 0.63   GLUCOSE 67*       No results for input(s): COVID19 in the last 720 hours. [id number]. See Epic. Diagnosis:      1. Right bunion    Plans:     1.  Right foot modified Buchanan bunionectomy      Caldwell Goodell, APRN - CNP  2021  3:51 PM

## 2021-04-21 LAB
EKG ATRIAL RATE: 79 BPM
EKG P AXIS: -4 DEGREES
EKG P-R INTERVAL: 160 MS
EKG Q-T INTERVAL: 378 MS
EKG QRS DURATION: 90 MS
EKG QTC CALCULATION (BAZETT): 433 MS
EKG R AXIS: 14 DEGREES
EKG T AXIS: 17 DEGREES
EKG VENTRICULAR RATE: 79 BPM
ESTIMATED AVERAGE GLUCOSE: 171 MG/DL
HBA1C MFR BLD: 7.6 % (ref 4–6)

## 2021-04-21 PROCEDURE — 93010 ELECTROCARDIOGRAM REPORT: CPT | Performed by: INTERNAL MEDICINE

## 2021-05-03 ENCOUNTER — HOSPITAL ENCOUNTER (OUTPATIENT)
Dept: LAB | Age: 51
Setting detail: SPECIMEN
Discharge: HOME | End: 2021-05-03
Payer: COMMERCIAL

## 2021-05-03 DIAGNOSIS — Z01.818 PREOP TESTING: Primary | ICD-10-CM

## 2021-05-03 PROCEDURE — U0005 INFEC AGEN DETEC AMPLI PROBE: HCPCS

## 2021-05-03 PROCEDURE — U0003 INFECTIOUS AGENT DETECTION BY NUCLEIC ACID (DNA OR RNA); SEVERE ACUTE RESPIRATORY SYNDROME CORONAVIRUS 2 (SARS-COV-2) (CORONAVIRUS DISEASE [COVID-19]), AMPLIFIED PROBE TECHNIQUE, MAKING USE OF HIGH THROUGHPUT TECHNOLOGIES AS DESCRIBED BY CMS-2020-01-R: HCPCS

## 2021-05-04 LAB
SARS-COV-2: ABNORMAL
SARS-COV-2: DETECTED
SOURCE: ABNORMAL

## 2021-06-04 ENCOUNTER — ANESTHESIA EVENT (OUTPATIENT)
Dept: OPERATING ROOM | Age: 51
End: 2021-06-04
Payer: COMMERCIAL

## 2021-06-04 ENCOUNTER — HOSPITAL ENCOUNTER (OUTPATIENT)
Age: 51
Setting detail: OUTPATIENT SURGERY
Discharge: HOME | End: 2021-06-04
Attending: PODIATRIST | Admitting: PODIATRIST
Payer: COMMERCIAL

## 2021-06-04 ENCOUNTER — ANESTHESIA (OUTPATIENT)
Dept: OPERATING ROOM | Age: 51
End: 2021-06-04
Payer: COMMERCIAL

## 2021-06-04 VITALS
BODY MASS INDEX: 33.94 KG/M2 | WEIGHT: 198.8 LBS | HEART RATE: 81 BPM | DIASTOLIC BLOOD PRESSURE: 77 MMHG | OXYGEN SATURATION: 95 % | SYSTOLIC BLOOD PRESSURE: 125 MMHG | HEIGHT: 64 IN | RESPIRATION RATE: 26 BRPM | TEMPERATURE: 97 F

## 2021-06-04 VITALS — TEMPERATURE: 98.6 F | SYSTOLIC BLOOD PRESSURE: 101 MMHG | DIASTOLIC BLOOD PRESSURE: 55 MMHG | OXYGEN SATURATION: 99 %

## 2021-06-04 DIAGNOSIS — G89.18 POSTOPERATIVE PAIN: ICD-10-CM

## 2021-06-04 DIAGNOSIS — Z98.890 STATUS POST RIGHT FOOT SURGERY: Primary | ICD-10-CM

## 2021-06-04 LAB
ABSOLUTE EOS #: 0.2 K/UL (ref 0–0.44)
ABSOLUTE IMMATURE GRANULOCYTE: 0.02 K/UL (ref 0–0.3)
ABSOLUTE LYMPH #: 2.73 K/UL (ref 1.1–3.7)
ABSOLUTE MONO #: 0.72 K/UL (ref 0.1–1.2)
ANION GAP SERPL CALCULATED.3IONS-SCNC: 13 MMOL/L (ref 9–17)
BASOPHILS # BLD: 0 % (ref 0–2)
BASOPHILS ABSOLUTE: 0.04 K/UL (ref 0–0.2)
BUN BLDV-MCNC: 10 MG/DL (ref 6–20)
BUN/CREAT BLD: 18 (ref 9–20)
CALCIUM SERPL-MCNC: 9.8 MG/DL (ref 8.6–10.4)
CHLORIDE BLD-SCNC: 103 MMOL/L (ref 98–107)
CO2: 23 MMOL/L (ref 20–31)
CREAT SERPL-MCNC: 0.55 MG/DL (ref 0.5–0.9)
DIFFERENTIAL TYPE: ABNORMAL
EOSINOPHILS RELATIVE PERCENT: 2 % (ref 1–4)
GFR AFRICAN AMERICAN: >60 ML/MIN
GFR NON-AFRICAN AMERICAN: >60 ML/MIN
GFR SERPL CREATININE-BSD FRML MDRD: ABNORMAL ML/MIN/{1.73_M2}
GFR SERPL CREATININE-BSD FRML MDRD: ABNORMAL ML/MIN/{1.73_M2}
GLUCOSE BLD-MCNC: 243 MG/DL (ref 65–105)
GLUCOSE BLD-MCNC: 305 MG/DL (ref 70–99)
HCT VFR BLD CALC: 38 % (ref 36.3–47.1)
HEMOGLOBIN: 12.1 G/DL (ref 11.9–15.1)
IMMATURE GRANULOCYTES: 0 %
LYMPHOCYTES # BLD: 27 % (ref 24–43)
MCH RBC QN AUTO: 27 PG (ref 25.2–33.5)
MCHC RBC AUTO-ENTMCNC: 31.8 G/DL (ref 28.4–34.8)
MCV RBC AUTO: 84.8 FL (ref 82.6–102.9)
MONOCYTES # BLD: 7 % (ref 3–12)
NRBC AUTOMATED: ABNORMAL PER 100 WBC
PDW BLD-RTO: 14 % (ref 11.8–14.4)
PLATELET # BLD: 489 K/UL (ref 138–453)
PLATELET ESTIMATE: ABNORMAL
PMV BLD AUTO: 9.3 FL (ref 8.1–13.5)
POTASSIUM SERPL-SCNC: 3.9 MMOL/L (ref 3.7–5.3)
RBC # BLD: 4.48 M/UL (ref 3.95–5.11)
RBC # BLD: ABNORMAL 10*6/UL
SEG NEUTROPHILS: 63 % (ref 36–65)
SEGMENTED NEUTROPHILS ABSOLUTE COUNT: 6.39 K/UL (ref 1.5–8.1)
SODIUM BLD-SCNC: 139 MMOL/L (ref 135–144)
WBC # BLD: 10.1 K/UL (ref 3.5–11.3)
WBC # BLD: ABNORMAL 10*3/UL

## 2021-06-04 PROCEDURE — 3600000012 HC SURGERY LEVEL 2 ADDTL 15MIN: Performed by: PODIATRIST

## 2021-06-04 PROCEDURE — 28292 COR HLX VLGS RSC PRX PHLX BS: CPT | Performed by: PODIATRIST

## 2021-06-04 PROCEDURE — 82947 ASSAY GLUCOSE BLOOD QUANT: CPT

## 2021-06-04 PROCEDURE — 7100000010 HC PHASE II RECOVERY - FIRST 15 MIN: Performed by: PODIATRIST

## 2021-06-04 PROCEDURE — 3700000001 HC ADD 15 MINUTES (ANESTHESIA): Performed by: PODIATRIST

## 2021-06-04 PROCEDURE — 6360000002 HC RX W HCPCS: Performed by: PODIATRIST

## 2021-06-04 PROCEDURE — 85025 COMPLETE CBC W/AUTO DIFF WBC: CPT

## 2021-06-04 PROCEDURE — 6370000000 HC RX 637 (ALT 250 FOR IP): Performed by: ANESTHESIOLOGY

## 2021-06-04 PROCEDURE — 3600000002 HC SURGERY LEVEL 2 BASE: Performed by: PODIATRIST

## 2021-06-04 PROCEDURE — 2500000003 HC RX 250 WO HCPCS: Performed by: PODIATRIST

## 2021-06-04 PROCEDURE — 3700000000 HC ANESTHESIA ATTENDED CARE: Performed by: PODIATRIST

## 2021-06-04 PROCEDURE — 2720000010 HC SURG SUPPLY STERILE: Performed by: PODIATRIST

## 2021-06-04 PROCEDURE — 2500000003 HC RX 250 WO HCPCS: Performed by: ANESTHESIOLOGY

## 2021-06-04 PROCEDURE — 80048 BASIC METABOLIC PNL TOTAL CA: CPT

## 2021-06-04 PROCEDURE — 6360000002 HC RX W HCPCS: Performed by: ANESTHESIOLOGY

## 2021-06-04 PROCEDURE — 2580000003 HC RX 258: Performed by: ANESTHESIOLOGY

## 2021-06-04 PROCEDURE — 2709999900 HC NON-CHARGEABLE SUPPLY: Performed by: PODIATRIST

## 2021-06-04 PROCEDURE — C9290 INJ, BUPIVACAINE LIPOSOME: HCPCS | Performed by: PODIATRIST

## 2021-06-04 PROCEDURE — 7100000011 HC PHASE II RECOVERY - ADDTL 15 MIN: Performed by: PODIATRIST

## 2021-06-04 RX ORDER — OXYCODONE HYDROCHLORIDE AND ACETAMINOPHEN 5; 325 MG/1; MG/1
1 TABLET ORAL PRN
Status: DISCONTINUED | OUTPATIENT
Start: 2021-06-04 | End: 2021-06-04 | Stop reason: HOSPADM

## 2021-06-04 RX ORDER — ONDANSETRON 2 MG/ML
4 INJECTION INTRAMUSCULAR; INTRAVENOUS
Status: DISCONTINUED | OUTPATIENT
Start: 2021-06-04 | End: 2021-06-04 | Stop reason: HOSPADM

## 2021-06-04 RX ORDER — PROMETHAZINE HYDROCHLORIDE 25 MG/ML
6.25 INJECTION, SOLUTION INTRAMUSCULAR; INTRAVENOUS
Status: DISCONTINUED | OUTPATIENT
Start: 2021-06-04 | End: 2021-06-04 | Stop reason: HOSPADM

## 2021-06-04 RX ORDER — SODIUM CHLORIDE 9 MG/ML
INJECTION, SOLUTION INTRAVENOUS CONTINUOUS
Status: DISCONTINUED | OUTPATIENT
Start: 2021-06-04 | End: 2021-06-04 | Stop reason: HOSPADM

## 2021-06-04 RX ORDER — PROPOFOL 10 MG/ML
INJECTION, EMULSION INTRAVENOUS PRN
Status: DISCONTINUED | OUTPATIENT
Start: 2021-06-04 | End: 2021-06-04 | Stop reason: SDUPTHER

## 2021-06-04 RX ORDER — LABETALOL HYDROCHLORIDE 5 MG/ML
5 INJECTION, SOLUTION INTRAVENOUS EVERY 10 MIN PRN
Status: DISCONTINUED | OUTPATIENT
Start: 2021-06-04 | End: 2021-06-04 | Stop reason: HOSPADM

## 2021-06-04 RX ORDER — LIDOCAINE HYDROCHLORIDE 20 MG/ML
INJECTION, SOLUTION INFILTRATION; PERINEURAL PRN
Status: DISCONTINUED | OUTPATIENT
Start: 2021-06-04 | End: 2021-06-04 | Stop reason: SDUPTHER

## 2021-06-04 RX ORDER — DEXAMETHASONE SODIUM PHOSPHATE 10 MG/ML
INJECTION INTRAMUSCULAR; INTRAVENOUS PRN
Status: DISCONTINUED | OUTPATIENT
Start: 2021-06-04 | End: 2021-06-04 | Stop reason: SDUPTHER

## 2021-06-04 RX ORDER — HYDROMORPHONE HYDROCHLORIDE 1 MG/ML
0.5 INJECTION, SOLUTION INTRAMUSCULAR; INTRAVENOUS; SUBCUTANEOUS EVERY 5 MIN PRN
Status: DISCONTINUED | OUTPATIENT
Start: 2021-06-04 | End: 2021-06-04 | Stop reason: HOSPADM

## 2021-06-04 RX ORDER — SODIUM CHLORIDE, SODIUM LACTATE, POTASSIUM CHLORIDE, CALCIUM CHLORIDE 600; 310; 30; 20 MG/100ML; MG/100ML; MG/100ML; MG/100ML
INJECTION, SOLUTION INTRAVENOUS CONTINUOUS
Status: DISCONTINUED | OUTPATIENT
Start: 2021-06-04 | End: 2021-06-04 | Stop reason: HOSPADM

## 2021-06-04 RX ORDER — ONDANSETRON 2 MG/ML
INJECTION INTRAMUSCULAR; INTRAVENOUS PRN
Status: DISCONTINUED | OUTPATIENT
Start: 2021-06-04 | End: 2021-06-04 | Stop reason: SDUPTHER

## 2021-06-04 RX ORDER — LIDOCAINE HYDROCHLORIDE 10 MG/ML
1 INJECTION, SOLUTION EPIDURAL; INFILTRATION; INTRACAUDAL; PERINEURAL
Status: DISCONTINUED | OUTPATIENT
Start: 2021-06-04 | End: 2021-06-04 | Stop reason: HOSPADM

## 2021-06-04 RX ORDER — SODIUM CHLORIDE 9 MG/ML
25 INJECTION, SOLUTION INTRAVENOUS PRN
Status: DISCONTINUED | OUTPATIENT
Start: 2021-06-04 | End: 2021-06-04 | Stop reason: HOSPADM

## 2021-06-04 RX ORDER — SODIUM CHLORIDE 0.9 % (FLUSH) 0.9 %
10 SYRINGE (ML) INJECTION EVERY 12 HOURS SCHEDULED
Status: DISCONTINUED | OUTPATIENT
Start: 2021-06-04 | End: 2021-06-04 | Stop reason: HOSPADM

## 2021-06-04 RX ORDER — HYDRALAZINE HYDROCHLORIDE 20 MG/ML
5 INJECTION INTRAMUSCULAR; INTRAVENOUS EVERY 10 MIN PRN
Status: DISCONTINUED | OUTPATIENT
Start: 2021-06-04 | End: 2021-06-04 | Stop reason: HOSPADM

## 2021-06-04 RX ORDER — OXYCODONE HYDROCHLORIDE AND ACETAMINOPHEN 5; 325 MG/1; MG/1
2 TABLET ORAL PRN
Status: DISCONTINUED | OUTPATIENT
Start: 2021-06-04 | End: 2021-06-04 | Stop reason: HOSPADM

## 2021-06-04 RX ORDER — FENTANYL CITRATE 50 UG/ML
25 INJECTION, SOLUTION INTRAMUSCULAR; INTRAVENOUS EVERY 5 MIN PRN
Status: DISCONTINUED | OUTPATIENT
Start: 2021-06-04 | End: 2021-06-04 | Stop reason: HOSPADM

## 2021-06-04 RX ORDER — SODIUM CHLORIDE 0.9 % (FLUSH) 0.9 %
10 SYRINGE (ML) INJECTION PRN
Status: DISCONTINUED | OUTPATIENT
Start: 2021-06-04 | End: 2021-06-04 | Stop reason: HOSPADM

## 2021-06-04 RX ORDER — MIDAZOLAM HYDROCHLORIDE 1 MG/ML
INJECTION INTRAMUSCULAR; INTRAVENOUS PRN
Status: DISCONTINUED | OUTPATIENT
Start: 2021-06-04 | End: 2021-06-04 | Stop reason: SDUPTHER

## 2021-06-04 RX ORDER — LIDOCAINE HYDROCHLORIDE 10 MG/ML
INJECTION, SOLUTION EPIDURAL; INFILTRATION; INTRACAUDAL; PERINEURAL PRN
Status: DISCONTINUED | OUTPATIENT
Start: 2021-06-04 | End: 2021-06-04 | Stop reason: ALTCHOICE

## 2021-06-04 RX ORDER — OXYCODONE HYDROCHLORIDE AND ACETAMINOPHEN 5; 325 MG/1; MG/1
1 TABLET ORAL EVERY 6 HOURS PRN
Qty: 28 TABLET | Refills: 0 | Status: SHIPPED | OUTPATIENT
Start: 2021-06-04 | End: 2021-06-11

## 2021-06-04 RX ORDER — FENTANYL CITRATE 50 UG/ML
INJECTION, SOLUTION INTRAMUSCULAR; INTRAVENOUS PRN
Status: DISCONTINUED | OUTPATIENT
Start: 2021-06-04 | End: 2021-06-04 | Stop reason: SDUPTHER

## 2021-06-04 RX ORDER — KETOROLAC TROMETHAMINE 30 MG/ML
INJECTION, SOLUTION INTRAMUSCULAR; INTRAVENOUS PRN
Status: DISCONTINUED | OUTPATIENT
Start: 2021-06-04 | End: 2021-06-04 | Stop reason: SDUPTHER

## 2021-06-04 RX ADMIN — LIDOCAINE HYDROCHLORIDE 50 MG: 20 INJECTION, SOLUTION INFILTRATION; PERINEURAL at 10:43

## 2021-06-04 RX ADMIN — PROPOFOL 50 MG: 10 INJECTION, EMULSION INTRAVENOUS at 10:43

## 2021-06-04 RX ADMIN — INSULIN HUMAN 10 UNITS: 100 INJECTION, SOLUTION PARENTERAL at 10:32

## 2021-06-04 RX ADMIN — DEXAMETHASONE SODIUM PHOSPHATE 5 MG: 10 INJECTION INTRAMUSCULAR; INTRAVENOUS at 10:48

## 2021-06-04 RX ADMIN — CEFAZOLIN SODIUM 2000 MG: 10 INJECTION, POWDER, FOR SOLUTION INTRAVENOUS at 10:45

## 2021-06-04 RX ADMIN — Medication 25 MCG: at 10:43

## 2021-06-04 RX ADMIN — Medication 25 MCG: at 11:16

## 2021-06-04 RX ADMIN — KETOROLAC TROMETHAMINE 15 MG: 30 INJECTION, SOLUTION INTRAMUSCULAR; INTRAVENOUS at 11:15

## 2021-06-04 RX ADMIN — PROPOFOL 50 MG: 10 INJECTION, EMULSION INTRAVENOUS at 10:59

## 2021-06-04 RX ADMIN — PROPOFOL 50 MG: 10 INJECTION, EMULSION INTRAVENOUS at 10:50

## 2021-06-04 RX ADMIN — MIDAZOLAM 2 MG: 1 INJECTION INTRAMUSCULAR; INTRAVENOUS at 10:37

## 2021-06-04 RX ADMIN — Medication 25 MCG: at 10:50

## 2021-06-04 RX ADMIN — ONDANSETRON 4 MG: 2 INJECTION, SOLUTION INTRAMUSCULAR; INTRAVENOUS at 11:01

## 2021-06-04 RX ADMIN — Medication 25 MCG: at 10:59

## 2021-06-04 RX ADMIN — PROPOFOL 120 MCG/KG/MIN: 10 INJECTION, EMULSION INTRAVENOUS at 10:45

## 2021-06-04 RX ADMIN — SODIUM CHLORIDE, POTASSIUM CHLORIDE, SODIUM LACTATE AND CALCIUM CHLORIDE: 600; 310; 30; 20 INJECTION, SOLUTION INTRAVENOUS at 09:47

## 2021-06-04 ASSESSMENT — PULMONARY FUNCTION TESTS
PIF_VALUE: 1
PIF_VALUE: 0
PIF_VALUE: 1
PIF_VALUE: 0
PIF_VALUE: 0
PIF_VALUE: 1

## 2021-06-04 ASSESSMENT — PAIN DESCRIPTION - PAIN TYPE: TYPE: CHRONIC PAIN

## 2021-06-04 ASSESSMENT — PAIN SCALES - GENERAL
PAINLEVEL_OUTOF10: 0
PAINLEVEL_OUTOF10: 3
PAINLEVEL_OUTOF10: 0

## 2021-06-04 ASSESSMENT — PAIN DESCRIPTION - ORIENTATION: ORIENTATION: RIGHT

## 2021-06-04 ASSESSMENT — PAIN DESCRIPTION - LOCATION: LOCATION: FOOT

## 2021-06-04 ASSESSMENT — PAIN DESCRIPTION - FREQUENCY: FREQUENCY: INTERMITTENT

## 2021-06-04 ASSESSMENT — PAIN DESCRIPTION - DESCRIPTORS: DESCRIPTORS: STABBING

## 2021-06-04 NOTE — H&P
History and Physical Service   [place]    HISTORY AND PHYSICAL EXAMINATION            Date of Evaluation: 6/4/2021  Patient name:  Martha Parikh  MRN:   [id number]  YOB: 1970  PCP:    Megan Barahona MD    History Obtained From:     Patient, Medical records    History of Present Illness: This is Martha Parikh a 46 y.o. female who presents today for a right foot modified Buchanan bunionectomy by Dr. Danna Foreman due to a right bunion. The patient's chief complaint is 8-9/10 right foot pain which has progressively worsened over the past 2 years. The pain is aggravated by walking and by wearing certain shoes that rub on the right bunion. The right foot pain improves when the pt wears house shoes while ambulating. Pt denies right foot edema and wounds. Pt denies fevers, chills, chest pain, dyspnea, rashes, open sores, and wounds. History of diabetes. Blood glucose today is 305 mg/dL (Dr. Annabel Hammans ordered 10 units of regular insulin to be given in Pre-op). Aspirin and Motrin were last taken on 05/28/2021. Vitamin D was last taken on 06/03/2021. Past Medical History:     Past Medical History:   Diagnosis Date    COVID-19     Diabetes mellitus ([address]     Eczema     History of blood transfusion 2003    After hysterectomy    Hyperlipidemia     Hypertension     PONV (postoperative nausea and vomiting)     Sleep apnea     C-PAP        Past Surgical History:     Past Surgical History:   Procedure Laterality Date    COLONOSCOPY      FINGER SURGERY  2019    Cyst removed from left thumb    HYSTERECTOMY          Medications Prior to Admission:     Prior to Admission medications    Medication Sig Start Date End Date Taking?  Authorizing Provider   insulin glargine (LANTUS SOLOSTAR) 100 UNIT/ML injection Inject 10 Units into the skin every morning   Yes Historical Provider, MD   Cholecalciferol (VITAMIN D3) 1.25 MG (35973 UT) CAPS TAKE 1 CAPSULE BY MOUTH ONCE A WEEK AFTER A MEAL 2/25/21  Yes Historical Provider, MD   Dulaglutide (TRULICITY) 7.91 ZZ/9.7NI SOPN Inject 0.75 mg into the skin once a week Every Monday   Yes Historical Provider, MD   Estradiol-Norethindrone Acet 0.5-0.1 MG TABS TAKE 1 TABLET BY MOUTH ONCE DAILY 2/11/21  Yes Historical Provider, MD   ammonium lactate (LAC-HYDRIN) 12 % cream Apply topically to feet once daily 1/13/21  Yes Edgard Solis DPM   metFORMIN (GLUCOPHAGE) 500 MG tablet Take 1,000 mg by mouth 2 times daily (with meals)    Yes Historical Provider, MD   insulin lispro (HUMALOG) 100 UNIT/ML injection vial Inject 2-12 Units into the skin 3 times daily (before meals)   Yes Historical Provider, MD   atorvastatin (LIPITOR) 40 MG tablet Take 40 mg by mouth every evening    Yes Historical Provider, MD   lisinopril (PRINIVIL;ZESTRIL) 5 MG tablet Take 10 mg by mouth daily    Yes Historical Provider, MD   ASPIRIN ADULT LOW STRENGTH 81 MG EC tablet TAKE 1 TABLET BY MOUTH ONCE DAILY 2/11/21   Historical Provider, MD   TRUE METRIX BLOOD GLUCOSE TEST strip USE 1 STRIP TO CHECK GLUCOSE 4 TIMES DAILY AS DIRECTED 3/2/21   Historical Provider, MD   ibuprofen (ADVIL;MOTRIN) 800 MG tablet TAKE 1 TABLET BY MOUTH ONCE DAILY AS NEEDED 2/11/21   Historical Provider, MD   triamcinolone (KENALOG) 0.1 % ointment APPLY OINTMENT TOPICALLY TO AFFECTED AREA TWICE DAILY AS NEEDED 2/24/21   Historical Provider, MD   ciclopirox (PENLAC) 8 % solution Apply topically nightly. Remove once weekly with alcohol or nail polish remover. 1/13/21   Edgard Solis DPM   Misc. Devices MISC 1 PAIR OF DIABETIC SHOES (1 LEFT/ 1 RIGHT)  1-3 PAIRS OF INSERTS (LEFT/ RIGHT) 10/28/20   Edgard Solis DPM   Blood Glucose Monitoring Suppl (BLOOD GLUCOSE MONITOR SYSTEM) w/Device KIT 1 Device by Does not apply route 3 times daily    Historical Provider, MD        Allergies:     No known allergies    Social History:     Tobacco:    reports that she quit smoking about 4 weeks ago. Her smoking use included cigarettes.  She started smoking about 33 years ago. She smoked 0.50 packs per day. She has never used smokeless tobacco.  Alcohol:      reports current alcohol use of about 4.0 standard drinks of alcohol per week. Drug Use:  reports no history of drug use. Family History:     History reviewed. No pertinent family history. Review of Systems:     Positive and Negative as described in HPI. CONSTITUTIONAL: Hot flashes. Negative for fevers, chills, fatigue, and weight loss. HEENT: Pt wears glasses. Negative for hearing changes, rhinorrhea, and throat pain. RESPIRATORY: Dyspnea with exertion. History of COVID-19 in 04/2021. Negative for cough, congestion, and wheezing. CARDIOVASCULAR: Negative for chest pain, blood clot, irregular heartbeat, and palpitations. GASTROINTESTINAL: Negative for reflux, nausea, vomiting, diarrhea, constipation, change in bowel habits, and abdominal pain. GENITOURINARY: Frequency. Negative for difficulty of urination, burning with urination, hematuria, and urgency. INTEGUMENT: Eczema on bilateral shins. Negative for wounds and easy bruising. HEMATOLOGIC/LYMPHATIC: Negative for swelling/edema. ALLERGIC/IMMUNOLOGIC: Negative for urticaria. ENDOCRINE: Diabetes. A1C was 7.6% on 04/20/2021. Hypoglycemic episodes occur twice per week while at work. Pt denies hypoglycemic symptoms at this time. Pt follows-up with Dr. Oleg Adair from endocrinology. Heat intolerance. Negative for cold intolerance. MUSCULOSKELETAL: See HPI. NEUROLOGICAL: Negative for headaches, dizziness, lightheadedness, numbness, and tingling extremities. Pt denies history of strokes and seizures. BEHAVIOR/PSYCH: Negative for depression and anxiety. Physical Exam:   /73   Pulse 95   Temp 96.8 °F (36 °C) (Temporal)   Resp 20   Ht 5' 4\" (1.626 m)   Wt 198 lb 12.8 oz (90.2 kg)   SpO2 98%   BMI 34.12 kg/m²   No LMP recorded. Patient has had a hysterectomy. No obstetric history on file.   No results for input(s): POCGLU in the last 72 hours. General Appearance:  Alert, well appearing, and in no acute distress. Mental status: Oriented to person, place, and time. Head: Normocephalic and atraumatic. Eye: No icterus, redness, pupils equal and reactive, extraocular eye movements intact, and conjunctiva clear. Ear: Hearing grossly intact. Nose: No drainage noted. Mouth: Mucous membranes moist.  Neck: Supple and no carotid bruits noted. Lungs: Bilateral equal air entry, clear to auscultation, no wheezing, rales or rhonchi, and normal effort. Cardiovascular: Normal rate, regular rhythm, no murmur, gallop, and rub. Abdomen: Soft, nontender, nondistended, and active bowel sounds. Neurologic: Normal speech and cranial nerves II through XII grossly intact. Strength 5/5 bilaterally. Skin: Darker pigmentation on bilateral shins. No open wounds, rashes, bruising, or bleeding on exposed skin area. Extremities: Right bunion. Posterior tibial pulses 2+ bilaterally. No pedal edema. No calf tenderness with palpation. Psych: Anxious. Tearful. Investigations:      Laboratory Testing:  No results found for this or any previous visit (from the past 24 hour(s)). No results for input(s): HGB, HCT, WBC, MCV, PLATELET, NA, K, CL, CO2, BUN, CREATININE, GLUCOSE, INR, PROTIME, APTT, AST, ALT, LABALBU, HCG in the last 720 hours. No results for input(s): COVID19 in the last 720 hours. Diagnosis:      1. Right bunion    Plans:     1.  Right foot modified Buchanan bunionectomy      PÉREZ Appiah CNP  6/4/2021  9:57 AM

## 2021-06-04 NOTE — ANESTHESIA POSTPROCEDURE EVALUATION
Department of Anesthesiology  Postprocedure Note    Patient: María Saez  MRN: [id number]  [place]: 1970  Date of evaluation: 6/4/2021  Time:  12:31 PM     Procedure Summary     Date: 06/04/21 Room / Location: [address]'[facility] - INPATIENT    Anesthesia Start: 4788 Anesthesia Stop: 1134    Procedure: RIGHT FOOT MODIFIED OLIVO BUNIONECTOMY (Right Foot) Diagnosis: (DX BUNION RIGHT)    Surgeons: Mayuri Orta DPM Responsible Provider: Luis Garay DO    Anesthesia Type: MAC, general ASA Status: 3          Anesthesia Type: MAC, general    Nina Phase I:      Nina Phase II: Nina Score: 10    Last vitals: Reviewed and per EMR flowsheets.        Anesthesia Post Evaluation    Patient location during evaluation: PACU  Patient participation: complete - patient participated  Level of consciousness: awake and alert  Airway patency: patent  Nausea & Vomiting: no nausea and no vomiting  Complications: no  Cardiovascular status: hemodynamically stable  Respiratory status: acceptable  Hydration status: stable

## 2021-06-04 NOTE — OP NOTE
mixture of 1% lidocaine plain and 0.5% Marcaine plain was injected. The foot was then prepped and draped in the usual aseptic fashion. The foot was then exsanguinated with an Esmarch bandage. The pneumatic ankle tourniquet was inflated to 250 mmHg. Attention was directed to the bunion deformity. A dorsal medial incision was created over the first metatarsophalangeal joint with a #15 blade. The incision was then deepened. The skin and subcutaneous tissue were then undermined off of the capsule medially. A dorsal linear capsular incision was then created over the first metatarsophalangeal joint. The periosteum and capsule were then reflected off of the first metatarsal head. Osteophytes were noted surrounding the 1st MTPJ complex, specifically the 1st metatarsal head. There was noted to be a loss of approximately 25% of the articular cartilage from the 1st metatarsal head. Sagittal saw was used to resect prominent osteophytes, areas were then smoothed with a power rasp. Copious amounts of sterile saline werethen used to irrigate the surgical site. A capsuloraphy was performed medially. The capsule was closed with 4-0 PDS. Subcutaneous closure was performed with 4-0 PDS followed by running subcuticular 4-0 monocryl. Steri-Strips were applied and 10 cc of Exparel was injected into the surgical site. Dressings consisted of adaptic, 4 x 4s, Kerlix and an ACE bandage. The pneumatic ankle tourniquet was released and immediate hyperemic flush was noted to all five digits of the right foot. A surgical boot was then applied postoperatively. The patient tolerated the above procedure and anesthesia well without complications. The patient was transported from the operating room to the PACU with vital signs stable and vascular status intact to the foot. The patient is to follow up with Dr. Rhett Hewitt as scheduled.     Electronically signed by Alexandra Garland DPM on 6/4/2021 at 12:45 PM

## 2021-06-04 NOTE — ANESTHESIA PRE PROCEDURE
TOPICALLY TO AFFECTED AREA TWICE DAILY AS NEEDED 2/24/21   Historical Provider, MD   ciclopirox (PENLAC) 8 % solution Apply topically nightly. Remove once weekly with alcohol or nail polish remover. 1/13/21   Erika Dilling, DPM   Misc. Devices MISC 1 PAIR OF DIABETIC SHOES (1 LEFT/ 1 RIGHT)  1-3 PAIRS OF INSERTS (LEFT/ RIGHT) 10/28/20   Erika Dilling, DPM   Blood Glucose Monitoring Suppl (BLOOD GLUCOSE MONITOR SYSTEM) w/Device KIT 1 Device by Does not apply route 3 times daily    Historical Provider, MD       Current medications:    Current Facility-Administered Medications   Medication Dose Route Frequency Provider Last Rate Last Admin    0.9 % sodium chloride infusion   Intravenous Continuous Abdulaziz Allen MD        lactated ringers infusion   Intravenous Continuous Abdulaziz Allen  mL/hr at 06/04/21 0947 New Bag at 06/04/21 0947    sodium chloride flush 0.9 % injection 10 mL  10 mL Intravenous 2 times per day Abdulaziz Allen MD        sodium chloride flush 0.9 % injection 10 mL  10 mL Intravenous PRN Abdulaziz Allen MD        0.9 % sodium chloride infusion  25 mL Intravenous PRN Abdulaziz Allen MD        lidocaine PF 1 % injection 1 mL  1 mL Intradermal Once PRN Abdulaziz Allen MD         Facility-Administered Medications Ordered in Other Encounters   Medication Dose Route Frequency Provider Last Rate Last Admin    midazolam (VERSED) injection   Intravenous PRN Lorelie Eastern, DO   2 mg at 06/04/21 1037    propofol injection   Intravenous PRN Lorelie Eastern, DO   50 mg at 06/04/21 1059    lidocaine 2 % injection   Intravenous PRN Lorelie Eastern, DO   50 mg at 06/04/21 1043    fentaNYL (SUBLIMAZE) injection   Intravenous PRN Lorelie Eastern, DO   25 mcg at 06/04/21 1059    dexamethasone (DECADRON) injection   Intravenous PRN Lorelie Eastern, DO   5 mg at 06/04/21 1048    ondansetron (ZOFRAN) injection   Intravenous PRN Lorelie Eastern, DO   4 mg at 06/04/21 1101       Allergies:     Allergies Allergen Reactions    No Known Allergies        Problem List:  There is no problem list on file for this patient. Past Medical History:        Diagnosis Date    COVID-19     Diabetes mellitus ([address]     Eczema     History of blood transfusion     After hysterectomy    Hyperlipidemia     Hypertension     PONV (postoperative nausea and vomiting)     Sleep apnea     C-PAP       Past Surgical History:        Procedure Laterality Date    COLONOSCOPY      FINGER SURGERY  2019    Cyst removed from left thumb    HYSTERECTOMY         Social History:    Social History     Tobacco Use    Smoking status: Former Smoker     Packs/day: 0.50     Types: Cigarettes     Start date: 1987     Quit date: 2021     Years since quittin.0    Smokeless tobacco: Never Used   Substance Use Topics    Alcohol use: Yes     Alcohol/week: 4.0 standard drinks     Types: 4 Cans of beer per week                                Counseling given: Not Answered      Vital Signs (Current):   Vitals:    21 0911   BP: 138/73   Pulse: 95   Resp: 20   Temp: 96.8 °F (36 °C)   TempSrc: Temporal   SpO2: 98%   Weight: 198 lb 12.8 oz (90.2 kg)   Height: 5' 4\" (1.626 m)                                              BP Readings from Last 3 Encounters:   21 138/73   21 (!) 100/58   21 138/71       NPO Status: Time of last liquid consumption:                         Time of last solid consumption:                         Date of last liquid consumption: 21                        Date of last solid food consumption: 21    BMI:   Wt Readings from Last 3 Encounters:   21 198 lb 12.8 oz (90.2 kg)   21 204 lb 6.4 oz (92.7 kg)   21 211 lb (95.7 kg)     Body mass index is 34.12 kg/m².     CBC:   Lab Results   Component Value Date    WBC 10.1 2021    RBC 4.48 2021    HGB 12.1 2021    HCT 38.0 2021    MCV 84.8 2021    RDW 14.0 2021     preop BS >300  10unit insulin given   Plan to recheck        Amanda Agudelo, DO   6/4/2021

## 2021-06-11 ENCOUNTER — OFFICE VISIT (OUTPATIENT)
Dept: PODIATRY | Age: 51
End: 2021-06-11

## 2021-06-11 VITALS — WEIGHT: 198 LBS | HEIGHT: 64 IN | BODY MASS INDEX: 33.8 KG/M2

## 2021-06-11 DIAGNOSIS — Z98.890 POST-OPERATIVE STATE: Primary | ICD-10-CM

## 2021-06-11 PROCEDURE — 99024 POSTOP FOLLOW-UP VISIT: CPT | Performed by: PODIATRIST

## 2021-06-11 RX ORDER — OXYCODONE HYDROCHLORIDE AND ACETAMINOPHEN 5; 325 MG/1; MG/1
1 TABLET ORAL EVERY 6 HOURS PRN
Qty: 28 TABLET | Refills: 0 | Status: SHIPPED | OUTPATIENT
Start: 2021-06-11 | End: 2021-06-18

## 2021-06-11 NOTE — PROGRESS NOTES
[facility] PHYSICIANS  [facility]  [address] [address] [place] [address]  Dept: [phone]  Dept Fax: [phone]    POST-OP PROGRESS NOTE  Date of patient's visit: 6/11/2021  Patient's Name:  Lico Steele YOB: 1970            Patient Care Team:  Aury Crump MD as PCP - General (Internal Medicine)  Murphy Yadav DPM as Physician (Podiatry)  Alberto Leyva DPM as Physician (Podiatry)        Chief Complaint   Patient presents with   Juanis Gonzalez       Pt's primary care physician is Aury Crump MD last seen may 8 2021     Subjective: Lico Steele is a 46 y.o. female who presents to the office today 1 week(s)  S/P RIGHT FOOT MODIFIED [employer] for correction of bunion right foot  Problem List Items Addressed This Visit     None      Visit Diagnoses     Post-operative state    -  Primary      . Patient relates pain is Present and IMPROVED. Pain is rated 2 out of 10 and is described as intermittent. Currently denies F/C/N/V. Is patient taking pain medications as prescribed and is controlling pain yes    Physical Examination:  Incision is coapted, sutures/steri-strips are intact. Minimal bleeding post operatively. Edema present. No erythema. No Pus. Operative correction is satisfactory. Assessment: Lico Steele is status post RIGHT FOOT MODIFIED OLIVO BUNIONECTOMY  Normal post operative course. Doing well          ICD-10-CM    1. Post-operative state  Z98.890          Plan:  Patient examined and evaluated. Current condition and treatment options discussed in detail. Advised pt to remain nonwb to right foot in postop shoe. Verbal and written instructions given to patient. Orders: No orders of the defined types were placed in this encounter. Contact office with any questions/problems/concerns. RTC in 1week(s).      Electronically signed by Murphy Yadav DPM on 6/11/2021 at 11:45 AM  6/11/2021

## 2021-06-11 NOTE — LETTER
[facility]  [address] [address]  Phone: [phone]  Fax: [phone]    Jenny Menon 26 June 11, 2021     Patient: Dania Nguyen   YOB: 1970   Date of Visit: 6/11/2021       To Whom It May Concern: It is my medical opinion that Dania Nugyen should remain out of work for another month due to foot surgery. If you have any questions or concerns, please don't hesitate to call.     Sincerely,        Carmella Rendon DPM

## 2021-06-16 ENCOUNTER — OFFICE VISIT (OUTPATIENT)
Dept: PODIATRY | Age: 51
End: 2021-06-16

## 2021-06-16 VITALS — WEIGHT: 198 LBS | RESPIRATION RATE: 16 BRPM | HEIGHT: 64 IN | BODY MASS INDEX: 33.8 KG/M2

## 2021-06-16 DIAGNOSIS — E11.51 TYPE II DIABETES MELLITUS WITH PERIPHERAL CIRCULATORY DISORDER (HCC): ICD-10-CM

## 2021-06-16 DIAGNOSIS — Z98.890 POST-OPERATIVE STATE: Primary | ICD-10-CM

## 2021-06-16 PROCEDURE — 99024 POSTOP FOLLOW-UP VISIT: CPT | Performed by: PODIATRIST

## 2021-06-16 RX ORDER — HYDROCODONE BITARTRATE AND ACETAMINOPHEN 5; 325 MG/1; MG/1
TABLET ORAL
COMMUNITY

## 2021-06-16 RX ORDER — ALBUTEROL SULFATE 90 UG/1
AEROSOL, METERED RESPIRATORY (INHALATION)
COMMUNITY
Start: 2021-04-28

## 2021-06-16 RX ORDER — ONDANSETRON 4 MG/1
TABLET, FILM COATED ORAL
COMMUNITY
Start: 2021-04-28

## 2021-06-16 NOTE — PROGRESS NOTES
[facility] PHYSICIANS  [facility]  [address] [place] [address]  Dept: [phone]  Dept Fax: [phone]    POST-OP PROGRESS NOTE  Date of patient's visit: 6/16/2021  Patient's Name:  Anthony Irwin YOB: 1970            Patient Care Team:  Willy Mckeon MD as PCP - General (Internal Medicine)  Tre Phan DPM as Physician (Podiatry)  Shanta Pop DPM as Physician (Podiatry)        Chief Complaint   Patient presents with    Post-Op Check     2 wk     Diabetes       Pt's primary care physician is Willy Mckeon MD last seen 01/24/2021     Subjective: Anthony Irwin is a 46 y.o. female who presents to the office today 2week(s)  S/P mccurdy bunionecomty, right   Problem List Items Addressed This Visit     None      Visit Diagnoses     Post-operative state    -  Primary    Type II diabetes mellitus with peripheral circulatory disorder ([facility] [address]          . Patient relates pain is present and improved. Pain is rated 3 out of 10 and is described as mild. Currently denies F/C/N/V. Physical Examination:  Incision is coapted, sutures/steri-strips are intact. Minimal bleeding post operatively. Edema present. No erythema. No Pus. Operative correction is satisfactory. Assessment: Anthony Irwin is status post  As above  Normal post operative course. Doing well          ICD-10-CM    1. Post-operative state  Z98.890    2. Type II diabetes mellitus with peripheral circulatory disorder (HCC)  E11.51          Plan:  Patient examined and evaluated. Current condition and treatment options discussed in detail. Sutures removed today. Advised pt to remain in post op shoe for 1 more week. May gradually transition to normal shoes in 1 week as tolerated. Verbal and written instructions given to patient. Orders: No orders of the defined types were placed in this encounter. Contact office with any questions/problems/concerns. RTC in 3week(s).      Electronically signed by Girish David DPM on 6/16/2021 at 10:54 AM  6/16/2021

## 2021-06-30 ENCOUNTER — OFFICE VISIT (OUTPATIENT)
Dept: PODIATRY | Age: 51
End: 2021-06-30

## 2021-06-30 VITALS — BODY MASS INDEX: 33.8 KG/M2 | HEIGHT: 64 IN | RESPIRATION RATE: 16 BRPM | WEIGHT: 198 LBS

## 2021-06-30 DIAGNOSIS — Z98.890 POST-OPERATIVE STATE: Primary | ICD-10-CM

## 2021-06-30 PROCEDURE — 99024 POSTOP FOLLOW-UP VISIT: CPT | Performed by: PODIATRIST

## 2021-06-30 NOTE — PROGRESS NOTES
[facility] PHYSICIANS  [facility]  [address] [place] [address]  Dept: [phone]  Dept Fax: [phone]    POST-OP PROGRESS NOTE  Date of patient's visit: 6/30/2021  Patient's Name:  Kayden Oreilly YOB: 1970            Patient Care Team:  Oscar Lundborg, MD as PCP - General (Internal Medicine)  Steven Amezquita DPM as Physician ([place])  Ervin Rodas DPM as Physician (Podiatry)        Chief Complaint   Patient presents with    Post-Op Check    Diabetes       Pt's primary care physician is Oscar Lundborg, MD last seen 01/24/2021     Subjective: Kayden Oreilly is a 46 y.o. female who presents to the office today 4week(s)  S/P Buchanan bunionectomy   Problem List Items Addressed This Visit     None      Visit Diagnoses     Post-operative state    -  Primary      . Patient relates pain is Present and improved. Pain is rated 3 out of 10 and is described as intermittent. Currently denies F/C/N/V. Physical Examination:  Incision is coapted, sutures/steri-strips are intact. Minimal bleeding post operatively. Edema present. No erythema. No Pus. Operative correction is satisfactory. Assessment: Kayden Oreilly is status post as above  Normal post operative course. Doing well          ICD-10-CM    1. Post-operative state  Z98.890          Plan:  Patient examined and evaluated. Current condition and treatment options discussed in detail. Advised pt to return to normal shoes as tolerated. Pt to perform passive ROM to increase ROM. Verbal and written instructions given to patient. Orders: No orders of the defined types were placed in this encounter. Contact office with any questions/problems/concerns. RTC in 1month(s).      Electronically signed by Steven Amezquita DPM on 6/30/2021 at 1:28 PM  6/30/2021

## 2022-11-09 ENCOUNTER — OFFICE VISIT (OUTPATIENT)
Dept: PODIATRY | Age: 52
End: 2022-11-09
Payer: COMMERCIAL

## 2022-11-09 VITALS — BODY MASS INDEX: 33.8 KG/M2 | HEIGHT: 64 IN | WEIGHT: 198 LBS

## 2022-11-09 DIAGNOSIS — E11.51 TYPE II DIABETES MELLITUS WITH PERIPHERAL CIRCULATORY DISORDER (HCC): Primary | ICD-10-CM

## 2022-11-09 DIAGNOSIS — B35.1 DERMATOPHYTOSIS OF NAIL: ICD-10-CM

## 2022-11-09 DIAGNOSIS — D23.71 BENIGN NEOPLASM OF SKIN OF LOWER LIMB, INCLUDING HIP, RIGHT: ICD-10-CM

## 2022-11-09 DIAGNOSIS — M79.605 PAIN IN BOTH LOWER EXTREMITIES: ICD-10-CM

## 2022-11-09 DIAGNOSIS — M79.604 PAIN IN BOTH LOWER EXTREMITIES: ICD-10-CM

## 2022-11-09 DIAGNOSIS — D23.72 BENIGN NEOPLASM OF SKIN OF LOWER LIMB, INCLUDING HIP, LEFT: ICD-10-CM

## 2022-11-09 DIAGNOSIS — L60.0 INGROWN NAIL: ICD-10-CM

## 2022-11-09 PROCEDURE — G8417 CALC BMI ABV UP PARAM F/U: HCPCS | Performed by: PODIATRIST

## 2022-11-09 PROCEDURE — 11721 DEBRIDE NAIL 6 OR MORE: CPT | Performed by: PODIATRIST

## 2022-11-09 PROCEDURE — 99213 OFFICE O/P EST LOW 20 MIN: CPT | Performed by: PODIATRIST

## 2022-11-09 PROCEDURE — 17110 DESTRUCTION B9 LES UP TO 14: CPT | Performed by: PODIATRIST

## 2022-11-09 PROCEDURE — 3046F HEMOGLOBIN A1C LEVEL >9.0%: CPT | Performed by: PODIATRIST

## 2022-11-09 PROCEDURE — G8484 FLU IMMUNIZE NO ADMIN: HCPCS | Performed by: PODIATRIST

## 2022-11-09 PROCEDURE — 2022F DILAT RTA XM EVC RTNOPTHY: CPT | Performed by: PODIATRIST

## 2022-11-09 PROCEDURE — 3017F COLORECTAL CA SCREEN DOC REV: CPT | Performed by: PODIATRIST

## 2022-11-09 PROCEDURE — 1036F TOBACCO NON-USER: CPT | Performed by: PODIATRIST

## 2022-11-09 PROCEDURE — G8427 DOCREV CUR MEDS BY ELIG CLIN: HCPCS | Performed by: PODIATRIST

## 2022-11-09 RX ORDER — AMMONIUM LACTATE 12 G/100G
CREAM TOPICAL
Qty: 1 EACH | Refills: 4 | Status: SHIPPED | OUTPATIENT
Start: 2022-11-09

## 2022-11-09 NOTE — PATIENT INSTRUCTIONS
Schedule a Vaccine  When you qualify to receive the vaccine, call the [facility]) COVID-19 Vaccination Hotline to schedule your appointment or to get additional information about the [facility]) locations which are offering the COVID-19 vaccine. To be 94% effective, it's important that you receive two doses of one of the COVID-19 vaccines. -If you are receiving the Dash Peter vaccine, your second shot will be scheduled as close to 21 days after the first shot as possible. -If you are receiving the Moderna vaccine, your second shot will be scheduled as close to 28 days after the first shot as possible. [facility]) COVID-19 Vaccination Hotline: [phone]    Links to [facility]) website and [facility] website:    [email or web address]    [email or web address]

## 2022-11-09 NOTE — PROGRESS NOTES
[address] [facility] PODIATRY [address] [address]  Dept: [phone]  Dept Fax: [phone]    DIABETIC PROGRESS NOTE  Date of patient's visit: 11/9/2022  Patient's Name:  Uvalod Feliz YOB: 1970            Patient Care Team:  Leatha Tobar MD as PCP - General (Internal Medicine)  Dontrell Lau DPM as Physician (Podiatry)  Ranjan Velasquez DPM as Physician (Podiatry)          Chief Complaint   Patient presents with    Diabetes     Diabetic foot care    Peripheral Neuropathy     Bilateral foot    Benign Neoplasm     Bilateral foot       Subjective:   Uvaldo Feliz comes to clinic for Diabetes (Diabetic foot care), Peripheral Neuropathy (Bilateral foot), and Benign Neoplasm (Bilateral foot)    she is a diabetic and states that needs toenails trimmed today. Pt currently has complaint of thickened, elongated nails that they cannot manage by themselves. Pt's primary care physician is Leatha Tobar MD last seen 5/25/22. Pt's last blood sugar was patient states did not check today. Pt has a new complaint of increased painful ingrown nail right great toe and painful skin lesions to loida feet. Lab Results   Component Value Date    LABA1C 7.6 (H) 04/20/2021      Complains of numbness in the feet bilat.   Past Medical History:   Diagnosis Date    COVID-19     Diabetes mellitus ([address]     Eczema     History of blood transfusion 2003    After hysterectomy    Hyperlipidemia     Hypertension     PONV (postoperative nausea and vomiting)     Sleep apnea     C-PAP       Allergies   Allergen Reactions    No Known Allergies      Current Outpatient Medications on File Prior to Visit   Medication Sig Dispense Refill    albuterol sulfate  (90 Base) MCG/ACT inhaler INHALE 2 PUFFS BY MOUTH EVERY 4 HOURS AS NEEDED      HYDROcodone-acetaminophen (NORCO) 5-325 MG per tablet hydrocodone 5 mg-acetaminophen 325 mg tablet   TAKE 1 TABLET BY MOUTH EVERY 6 HOURS AS NEEDED      ondansetron (ZOFRAN) 4 MG tablet TAKE 1 TABLET BY MOUTH EVERY 8 HOURS AS NEEDED FOR NAUSEA AND VOMITING      insulin glargine (LANTUS SOLOSTAR) 100 UNIT/ML injection Inject 10 Units into the skin every morning      ASPIRIN ADULT LOW STRENGTH 81 MG EC tablet TAKE 1 TABLET BY MOUTH ONCE DAILY      Cholecalciferol (VITAMIN D3) 1.25 MG (68574 UT) CAPS TAKE 1 CAPSULE BY MOUTH ONCE A WEEK AFTER A MEAL      Dulaglutide (TRULICITY) 3.32 FG/9.2GX SOPN Inject 0.75 mg into the skin once a week Every Monday      Estradiol-Norethindrone Acet 0.5-0.1 MG TABS TAKE 1 TABLET BY MOUTH ONCE DAILY      TRUE METRIX BLOOD GLUCOSE TEST strip USE 1 STRIP TO CHECK GLUCOSE 4 TIMES DAILY AS DIRECTED      ibuprofen (ADVIL;MOTRIN) 800 MG tablet TAKE 1 TABLET BY MOUTH ONCE DAILY AS NEEDED      triamcinolone (KENALOG) 0.1 % ointment APPLY OINTMENT TOPICALLY TO AFFECTED AREA TWICE DAILY AS NEEDED      Misc. Devices MISC 1 PAIR OF DIABETIC SHOES (1 LEFT/ 1 RIGHT)  1-3 PAIRS OF INSERTS (LEFT/ RIGHT) 2 Device 0    metFORMIN (GLUCOPHAGE) 500 MG tablet Take 1,000 mg by mouth 2 times daily (with meals)       insulin lispro (HUMALOG) 100 UNIT/ML injection vial Inject 2-12 Units into the skin 3 times daily (before meals)      atorvastatin (LIPITOR) 40 MG tablet Take 40 mg by mouth every evening       lisinopril (PRINIVIL;ZESTRIL) 5 MG tablet Take 10 mg by mouth daily       Blood Glucose Monitoring Suppl (BLOOD GLUCOSE MONITOR SYSTEM) w/Device KIT 1 Device by Does not apply route 3 times daily       No current facility-administered medications on file prior to visit. Review of Systems    Review of Systems:   History obtained from chart review and the patient  General ROS: negative for - chills, fatigue, fever, night sweats or weight gain  Constitutional: Negative for chills, diaphoresis, fatigue, fever and unexpected weight change.   Musculoskeletal: Positive for arthralgias, gait problem and joint swelling. Neurological ROS: negative for - behavioral changes, confusion, headaches or seizures. Negative for weakness and numbness. Dermatological ROS: negative for - mole changes, rash  Cardiovascular: Negative for leg swelling. Gastrointestinal: Negative for constipation, diarrhea, nausea and vomiting. Objective:  Dermatologic Exam:  Right medial hallux nail is incurvated with edema. Soft tissue lesion to the plantar right and left foot with central core and petechiae. Pain on palpation of lesion. Skin No rashes or nodules noted. .   Skin is thin, with flaky sloughing skin as well as decreased hair growth to the lower leg  Small red hemosiderin deposits seen dorsal foot   Musculoskeletal:     1st MPJ ROM decreased, Bilateral.  Muscle strength 5/5, Bilateral.  Pain present upon palpation of toenails 1-5, Bilateral. decreased medial longitudinal arch, Bilateral.  Ankle ROM decreased,Bilateral.    Dorsally contracted digits present digits 2, Bilateral.     Vascular: DP pulses 1/4 bilateral.  PT pulses 0/4 bilateral.   CFT <5 seconds, Bilateral.  Hair growth absent to the level of the digits, Bilateral.  Edema present, Bilateral.  Varicosities absent, Bilateral. Erythema absent, Bilateral    Neurological: Sensation diminshed to light touch to level of digits, Bilateral.  Protective sensation intact 6/10 sites via 5.07/10g [place] Monofilament, Bilateral.  negative Tinel's, Bilateral.  negative Valleix sign, Bilateral.      Integument: Warm, dry, supple, Bilateral.  Open lesion absent, Bilateral.  Interdigital maceration absent to web spaces 4, Bilateral.  Nails 1-5 left and 1-5 right thickened > 3.0 mm, dystrophic and crumbly, discolored with subungual debris. Fissures absent, Bilateral.   General: AAO x 3 in NAD.     Derm  Toenail Description  Sites of Onychomycosis Involvement (Check affected area)  [x] [x] [x] [x] [x] [x] [x] [x] [x] [x]  5 4 3 2 1 1 2 3 4 5                          Right Left    Thickness  [x] [x] [x] [x] [x] [x] [x] [x] [x] [x]  5 4 3 2 1 1 2 3 4 5                         Right                                        Left    Dystrophic Changes   [x] [x] [x] [x] [x] [x] [x] [x] [x] [x]  5 4 3 2 1 1 2 3 4 5                         Right                                        Left    Color   [x] [x] [x] [x] [x] [x] [x] [x] [x] [x]  5 4 3 2 1 1 2 3 4 5                          Right                                        Left    Incurvation/Ingrowin   [] [] [] [] [] [] [] [] [] []  5 4 3 2 1 1 2 3 4 5                         Right                                        Left    Inflammation/Pain   [x] [x] [x] [x] [x] [x] [x] [x] [x] [x]  5 4 3 2 1 1 2 3 4 5                         Right                                        Left        Visual inspection:  Deformity: hammertoe deformity loida feet  amputation: absent  Skin lesions: as above  Edema: right- 2+ pitting edema, left- 2+ pitting edema    Sensory exam:  Monofilament sensation: abnormal - 6/10 via SW 5.07/10g monofilament to the plantar foot bilateral feet    Pulses: abnormal - 1/4 dorsalis pedis pulse and 0/4 Posterior tibial pulse,   Pinprick: Impaired  Proprioception: Impaired  Vibration (128 Hz): Impaired       DM with PVD       [x]Yes    []No      Assessment:  45 y.o. female with:   Diagnosis Orders   1. Type II diabetes mellitus with peripheral circulatory disorder (HCC)  00046 - MO DEBRIDEMENT OF NAILS, 6 OR MORE      2. Pain in both lower extremities  95170 - MO DEBRIDEMENT OF NAILS, 6 OR MORE    92719 - MO DESTRUCTION BENIGN LESIONS UP TO 14      3. Dermatophytosis of nail  07483 - MO DEBRIDEMENT OF NAILS, 6 OR MORE      4. Ingrown nail  61969 - MO DEBRIDEMENT OF NAILS, 6 OR MORE      5. Benign neoplasm of skin of lower limb, including hip, right  26199 - MO DESTRUCTION BENIGN LESIONS UP TO 14      6.  Benign neoplasm of skin of lower limb, including hip, left  71852 - MO DESTRUCTION BENIGN LESIONS UP TO 14                Q7   []Yes    []No                Q8   [x]Yes    []No                     Q9   []Yes    []No    Plan:   Pt was evaluated and examined. Patient was given personalized discharge instructions. The lesions were partially excised via 15 blade and silver nitrate was applied under occlusion. The patient tolerated the procedure well and without complication. Advised patient to use vasoline to the area after tomorrow to prevent surrounding tissue irritation. Slant back procedure perform on nail border using nail nipper to patients tolerance. Advised pt to consider nail avulsion at next visit if symptoms persist or worsen. Pt to monitor for increased signs of infection such as erythema, edema and drainage. Nails 1-10 were debrided sharply in length and thickness with a nipper and , without incident. Pt will follow up in 9 weeks or sooner if any problems arise. Diagnosis was discussed with the pt and all of their questions were answered in detail. Proper foot hygiene and care was discussed with the pt. Informed patient on proper diabetic foot care and importance of tight glycemic control. Patient to check feet daily and contact the office with any questions/problems/concerns. Other comorbidity noted and will be managed by PCP.   11/9/2022    Electronically signed by Kristi Griffin DPM on 11/9/2022 at 12:12 PM  11/9/2022

## 2023-07-12 ENCOUNTER — OFFICE VISIT (OUTPATIENT)
Dept: PODIATRY | Age: 53
End: 2023-07-12
Payer: COMMERCIAL

## 2023-07-12 VITALS — HEIGHT: 64 IN | BODY MASS INDEX: 33.8 KG/M2 | WEIGHT: 198 LBS

## 2023-07-12 DIAGNOSIS — M79.604 PAIN IN BOTH LOWER EXTREMITIES: ICD-10-CM

## 2023-07-12 DIAGNOSIS — L60.0 INGROWN NAIL: ICD-10-CM

## 2023-07-12 DIAGNOSIS — M79.605 PAIN IN BOTH LOWER EXTREMITIES: ICD-10-CM

## 2023-07-12 DIAGNOSIS — E11.51 TYPE II DIABETES MELLITUS WITH PERIPHERAL CIRCULATORY DISORDER (HCC): Primary | ICD-10-CM

## 2023-07-12 DIAGNOSIS — B35.1 DERMATOPHYTOSIS OF NAIL: ICD-10-CM

## 2023-07-12 PROCEDURE — 3017F COLORECTAL CA SCREEN DOC REV: CPT | Performed by: PODIATRIST

## 2023-07-12 PROCEDURE — G8417 CALC BMI ABV UP PARAM F/U: HCPCS | Performed by: PODIATRIST

## 2023-07-12 PROCEDURE — 3046F HEMOGLOBIN A1C LEVEL >9.0%: CPT | Performed by: PODIATRIST

## 2023-07-12 PROCEDURE — 1036F TOBACCO NON-USER: CPT | Performed by: PODIATRIST

## 2023-07-12 PROCEDURE — 2022F DILAT RTA XM EVC RTNOPTHY: CPT | Performed by: PODIATRIST

## 2023-07-12 PROCEDURE — 11721 DEBRIDE NAIL 6 OR MORE: CPT | Performed by: PODIATRIST

## 2023-07-12 PROCEDURE — G8427 DOCREV CUR MEDS BY ELIG CLIN: HCPCS | Performed by: PODIATRIST

## 2023-07-12 PROCEDURE — 99213 OFFICE O/P EST LOW 20 MIN: CPT | Performed by: PODIATRIST

## 2023-07-12 RX ORDER — METFORMIN HYDROCHLORIDE 500 MG/1
TABLET, EXTENDED RELEASE ORAL
COMMUNITY
Start: 2023-06-29

## 2023-07-12 RX ORDER — LISINOPRIL 10 MG/1
10 TABLET ORAL EVERY MORNING
COMMUNITY
Start: 2023-07-04

## 2023-07-12 RX ORDER — TIZANIDINE 4 MG/1
TABLET ORAL
COMMUNITY
Start: 2023-05-16

## 2023-10-04 ENCOUNTER — OFFICE VISIT (OUTPATIENT)
Dept: PODIATRY | Age: 53
End: 2023-10-04

## 2023-10-04 VITALS — HEIGHT: 64 IN | BODY MASS INDEX: 33.8 KG/M2 | WEIGHT: 198 LBS

## 2023-10-04 DIAGNOSIS — M79.605 PAIN IN BOTH LOWER EXTREMITIES: ICD-10-CM

## 2023-10-04 DIAGNOSIS — L60.0 INGROWN NAIL: ICD-10-CM

## 2023-10-04 DIAGNOSIS — M72.2 PLANTAR FASCIITIS, RIGHT: ICD-10-CM

## 2023-10-04 DIAGNOSIS — E11.51 TYPE II DIABETES MELLITUS WITH PERIPHERAL CIRCULATORY DISORDER (HCC): Primary | ICD-10-CM

## 2023-10-04 DIAGNOSIS — B35.1 DERMATOPHYTOSIS OF NAIL: ICD-10-CM

## 2023-10-04 DIAGNOSIS — M79.604 PAIN IN BOTH LOWER EXTREMITIES: ICD-10-CM

## 2023-10-04 NOTE — PROGRESS NOTES
[facility] PODIATRY [address] [place] [address]  Dept: [phone]  Dept Fax: [phone]    DIABETIC PROGRESS NOTE  Date of patient's visit: 10/4/2023  Patient's Name:  Kings Thompson YOB: 1970            Patient Care Team:  Merrill Edwards MD as PCP - General (Internal Medicine)  Franchesca August DPM as Physician (Podiatry)  Gabbie Christine DPM as Physician (Podiatry)          Chief Complaint   Patient presents with    Diabetes     Diabetic foot care   Bs 128    Peripheral Neuropathy     Bilateral feet     Foot Pain     Arch on Bilateral feet      Toe Pain     Right great toe burning x 2 weeks        Subjective:   Kings Thompson comes to clinic for Diabetes (Diabetic foot care /Bs 128), Peripheral Neuropathy (Bilateral feet ), Foot Pain (Arch on Bilateral feet  ), and Toe Pain (Right great toe burning x 2 weeks )    she is a diabetic and states that needs toenails trimmed today. Pt currently has complaint of thickened, elongated nails that they cannot manage by themselves. Pt's primary care physician is Merrill Edwards MD last seen 8/1/23. Pt's last blood sugar was 128-today. Pt has a new complaint of bilateral foot- arch and right great toe has been burning for 2 weeks. Lab Results   Component Value Date    LABA1C 7.6 (H) 04/20/2021      Complains of numbness in the feet bilat.   Past Medical History:   Diagnosis Date    COVID-19     Diabetes mellitus ([address])     Eczema     History of blood transfusion 2003    After hysterectomy    Hyperlipidemia     Hypertension     PONV (postoperative nausea and vomiting)     Sleep apnea     C-PAP       Allergies   Allergen Reactions    No Known Allergies      Current Outpatient Medications on File Prior to Visit   Medication Sig Dispense Refill    lisinopril (PRINIVIL;ZESTRIL) 10 MG tablet Take 1 tablet by mouth every morning      metFORMIN (GLUCOPHAGE-XR) 500 MG

## 2023-12-04 ENCOUNTER — OFFICE VISIT (OUTPATIENT)
Dept: PODIATRY | Age: 53
End: 2023-12-04
Payer: COMMERCIAL

## 2023-12-04 VITALS — WEIGHT: 198 LBS | HEIGHT: 64 IN | BODY MASS INDEX: 33.8 KG/M2

## 2023-12-04 DIAGNOSIS — B35.1 DERMATOPHYTOSIS OF NAIL: ICD-10-CM

## 2023-12-04 DIAGNOSIS — M79.605 PAIN IN BOTH LOWER EXTREMITIES: ICD-10-CM

## 2023-12-04 DIAGNOSIS — M79.604 PAIN IN BOTH LOWER EXTREMITIES: ICD-10-CM

## 2023-12-04 DIAGNOSIS — L60.0 INGROWN NAIL: ICD-10-CM

## 2023-12-04 DIAGNOSIS — E11.51 TYPE II DIABETES MELLITUS WITH PERIPHERAL CIRCULATORY DISORDER (HCC): Primary | ICD-10-CM

## 2023-12-04 PROCEDURE — 1036F TOBACCO NON-USER: CPT | Performed by: PODIATRIST

## 2023-12-04 PROCEDURE — 11750 EXCISION NAIL&NAIL MATRIX: CPT | Performed by: PODIATRIST

## 2023-12-04 PROCEDURE — G8417 CALC BMI ABV UP PARAM F/U: HCPCS | Performed by: PODIATRIST

## 2023-12-04 PROCEDURE — G8427 DOCREV CUR MEDS BY ELIG CLIN: HCPCS | Performed by: PODIATRIST

## 2023-12-04 PROCEDURE — 11721 DEBRIDE NAIL 6 OR MORE: CPT | Performed by: PODIATRIST

## 2023-12-04 PROCEDURE — G8484 FLU IMMUNIZE NO ADMIN: HCPCS | Performed by: PODIATRIST

## 2023-12-04 PROCEDURE — 99215 OFFICE O/P EST HI 40 MIN: CPT | Performed by: PODIATRIST

## 2023-12-04 PROCEDURE — 2022F DILAT RTA XM EVC RTNOPTHY: CPT | Performed by: PODIATRIST

## 2023-12-04 PROCEDURE — 3046F HEMOGLOBIN A1C LEVEL >9.0%: CPT | Performed by: PODIATRIST

## 2023-12-04 PROCEDURE — 3017F COLORECTAL CA SCREEN DOC REV: CPT | Performed by: PODIATRIST

## 2023-12-04 NOTE — PROGRESS NOTES
[address] [facility],[address] PODIATRY [address] [place] [address]  Dept: [phone]  Dept Fax: [phone]    DIABETIC PROGRESS NOTE  Date of patient's visit: 12/4/2023  Patient's Name:  Peggy Velasquez YOB: 1970            Patient Care Team:  Marysol Callaway MD as PCP - General (Internal Medicine)  Debbie Alston DPM as Physician (Podiatry)  Mich Cyr DPM as Physician (Podiatry)          Chief Complaint   Patient presents with    Diabetes     Diabetic foot care        Peripheral Neuropathy     Bilateral feet     Ingrown Toenail     Right great toenail ingrown        Subjective:   Peggy Velasquez comes to clinic for painful toes. she is a diabetic and states that needs toenails trimmed today. Pt currently has complaint of thickened, elongated nails that they cannot manage by themselves. Pt's primary care physician is Marysol Callaway MD last seen 8/1/23. Pt's last blood sugar was 131-today. Pt has a new complaint of  right great ingrown toenail, onset since last office visit. Lab Results   Component Value Date    LABA1C 7.6 (H) 04/20/2021      Complains of numbness in the feet bilat. Past Medical History:   Diagnosis Date    COVID-19     Diabetes mellitus ([address])     Eczema     History of blood transfusion 2003    After hysterectomy    Hyperlipidemia     Hypertension     PONV (postoperative nausea and vomiting)     Sleep apnea     C-PAP       Allergies   Allergen Reactions    No Known Allergies      Current Outpatient Medications on File Prior to Visit   Medication Sig Dispense Refill    lisinopril (PRINIVIL;ZESTRIL) 10 MG tablet Take 1 tablet by mouth every morning      metFORMIN (GLUCOPHAGE-XR) 500 MG extended release tablet TAKE 2 TABLETS BY MOUTH IN THE MORNING AND AT BEDTIME.  DO NOT CRUSH, CHEW OR SPLIT.      tiZANidine (ZANAFLEX) 4 MG tablet TAKE 1 TABLET BY MOUTH THREE TIMES DAILY AS NEEDED FOR MUSCLE

## 2023-12-11 ENCOUNTER — OFFICE VISIT (OUTPATIENT)
Dept: PODIATRY | Age: 53
End: 2023-12-11

## 2023-12-11 VITALS — HEIGHT: 64 IN | BODY MASS INDEX: 33.8 KG/M2 | WEIGHT: 198 LBS

## 2023-12-11 DIAGNOSIS — M79.605 PAIN IN BOTH LOWER EXTREMITIES: ICD-10-CM

## 2023-12-11 DIAGNOSIS — Z98.890 POSTOPERATIVE STATE: Primary | ICD-10-CM

## 2023-12-11 DIAGNOSIS — M79.604 PAIN IN BOTH LOWER EXTREMITIES: ICD-10-CM

## 2023-12-11 PROCEDURE — 99024 POSTOP FOLLOW-UP VISIT: CPT | Performed by: PODIATRIST

## 2023-12-11 NOTE — PROGRESS NOTES
[address] [facility],[address] PODIATRY [address] [facility]  Dept: [phone]  Dept Fax: [phone]    POST-OP PROGRESS NOTE  Date of patient's visit: 12/11/2023  Patient's Name:  Sabrina Du YOB: 1970            Patient Care Team:  Coretta Sawyer MD as PCP - General (Internal Medicine)  Shaan Marc DPM as Physician (Podiatry)  Migel Garcia DPM as Physician (Podiatry)        Chief Complaint   Patient presents with    Post-Op Check     Rtc 1 week - IGTN right great toe    Nail Problem     Right great toe, onset since last office visit  . Subjective: Sabrina Du is a 52 y.o. female who presents to the office today 1 week(s)  S/P  IGTN right great toe for correction of right great toe pain and discomfort. Also has concerns with left great toe pain. Problem List Items Addressed This Visit    None  Visit Diagnoses       Postoperative state    -  Primary        Patient relates pain is Present and improved on the right great toe. Left great toe, onset since last office visit. Pain is rated 7 out of 10 and is described as constant, moderate, severe. Currently denies F/C/N/V. Is patient taking pain medications as prescribed and is controlling pain n/a    Physical Examination:  Minimal bleeding post operatively. Edema present. No erythema. No Pus. Operative correction is satisfactory. Assessment: Sabrina Du is status post as above  Normal post operative course. Doing well          ICD-10-CM    1. Postoperative state  Z98.890             Plan:  Patient examined and evaluated. Current condition and treatment options discussed in detail. Advised pt to monitor daily for infection. Verbal and written instructions given to patient. Orders: No orders of the defined types were placed in this encounter. Contact office with any questions/problems/concerns. RTC in 2month(s).      Electronically signed by

## 2024-03-04 ENCOUNTER — OFFICE VISIT (OUTPATIENT)
Dept: PODIATRY | Age: 54
End: 2024-03-04
Payer: COMMERCIAL

## 2024-03-04 VITALS — WEIGHT: 198 LBS | BODY MASS INDEX: 33.8 KG/M2 | HEIGHT: 64 IN

## 2024-03-04 DIAGNOSIS — L85.3 XEROSIS CUTIS: ICD-10-CM

## 2024-03-04 DIAGNOSIS — E11.51 TYPE II DIABETES MELLITUS WITH PERIPHERAL CIRCULATORY DISORDER (HCC): Primary | ICD-10-CM

## 2024-03-04 DIAGNOSIS — M79.604 PAIN IN BOTH LOWER EXTREMITIES: ICD-10-CM

## 2024-03-04 DIAGNOSIS — B35.1 DERMATOPHYTOSIS OF NAIL: ICD-10-CM

## 2024-03-04 DIAGNOSIS — L60.0 INGROWN NAIL: ICD-10-CM

## 2024-03-04 DIAGNOSIS — M79.605 PAIN IN BOTH LOWER EXTREMITIES: ICD-10-CM

## 2024-03-04 DIAGNOSIS — E11.42 DIABETIC POLYNEUROPATHY ASSOCIATED WITH TYPE 2 DIABETES MELLITUS (HCC): ICD-10-CM

## 2024-03-04 PROCEDURE — 3017F COLORECTAL CA SCREEN DOC REV: CPT | Performed by: PODIATRIST

## 2024-03-04 PROCEDURE — 3046F HEMOGLOBIN A1C LEVEL >9.0%: CPT | Performed by: PODIATRIST

## 2024-03-04 PROCEDURE — 1036F TOBACCO NON-USER: CPT | Performed by: PODIATRIST

## 2024-03-04 PROCEDURE — G8484 FLU IMMUNIZE NO ADMIN: HCPCS | Performed by: PODIATRIST

## 2024-03-04 PROCEDURE — 99214 OFFICE O/P EST MOD 30 MIN: CPT | Performed by: PODIATRIST

## 2024-03-04 PROCEDURE — 11721 DEBRIDE NAIL 6 OR MORE: CPT | Performed by: PODIATRIST

## 2024-03-04 PROCEDURE — G8427 DOCREV CUR MEDS BY ELIG CLIN: HCPCS | Performed by: PODIATRIST

## 2024-03-04 PROCEDURE — G8417 CALC BMI ABV UP PARAM F/U: HCPCS | Performed by: PODIATRIST

## 2024-03-04 PROCEDURE — 2022F DILAT RTA XM EVC RTNOPTHY: CPT | Performed by: PODIATRIST

## 2024-03-04 RX ORDER — AMMONIUM LACTATE 12 G/100G
CREAM TOPICAL
Qty: 1 EACH | Refills: 4 | Status: SHIPPED | OUTPATIENT
Start: 2024-03-04

## 2024-03-04 RX ORDER — CARBAMAZEPINE 200 MG/1
CAPSULE, EXTENDED RELEASE ORAL
COMMUNITY

## 2024-03-04 RX ORDER — GABAPENTIN 100 MG/1
100 CAPSULE ORAL 3 TIMES DAILY
Qty: 90 CAPSULE | Refills: 0 | Status: SHIPPED | OUTPATIENT
Start: 2024-03-04 | End: 2024-04-03

## 2024-03-11 NOTE — PROGRESS NOTES
[facility] PODIATRY [address]  [address]  [place] [id number]  Dept: [phone]  Dept Fax: [phone]    DIABETIC PROGRESS NOTE  Date of patient's visit: 3/11/2024  Patient's Name:  Sridevi Carvalho YOB: 1970            Patient Care Team:  Helen Sahni APRN - CNP as PCP - General (Family Medicine)  Dari Seay DPM as Physician (Podiatry)  Jean-Claude Seay DPM as Physician (Podiatry)          Chief Complaint   Patient presents with    Diabetes     Diabetic foot care     Peripheral Neuropathy     Bilateral feet     Toe Pain     Left 2nd toe painful 2 months     Benign Neoplasm     Bilateral feet        Subjective:   Sridevi Carvalho comes to clinic for Diabetes (Diabetic foot care ), Peripheral Neuropathy (Bilateral feet ), Toe Pain (Left 2nd toe painful 2 months ), and Benign Neoplasm (Bilateral feet )    she is a diabetic and states that needs toenails trimmed today.  Pt currently has complaint of thickened, elongated nails that they cannot manage by themselves.   Pt's primary care physician is Helen Sahni APRN - CNP last seen 8/1/23.   Pt's last blood sugar was 128-today.    Pt has a new complaint of bilateral foot pain with increased burning tingling and numbness.        Lab Results   Component Value Date    LABA1C 7.6 (H) 04/20/2021      Complains of numbness in the feet bilat.  Past Medical History:   Diagnosis Date    COVID-19     Diabetes mellitus (HCC)     Eczema     History of blood transfusion 2003    After hysterectomy    Hyperlipidemia     Hypertension     PONV (postoperative nausea and vomiting)     Sleep apnea     C-PAP       Allergies   Allergen Reactions    No Known Allergies      Current Outpatient Medications on File Prior to Visit   Medication Sig Dispense Refill    carBAMazepine, Antipsychotic, (EQUETRO) 200 MG CP12 carbamazepine   200mg BID      lisinopril (PRINIVIL;ZESTRIL) 10 MG tablet Take 1 tablet

## 2025-03-27 ENCOUNTER — TRANSCRIBE ORDERS (OUTPATIENT)
Dept: ADMINISTRATIVE | Age: 55
End: 2025-03-27

## 2025-03-27 DIAGNOSIS — Z12.31 ENCOUNTER FOR SCREENING MAMMOGRAM FOR MALIGNANT NEOPLASM OF BREAST: Primary | ICD-10-CM

## (undated) DEVICE — 3M™ STERI-STRIP™ COMPOUND BENZOIN TINCTURE 40 BAGS/CARTON 4 CARTONS/CASE C1544: Brand: 3M™ STERI-STRIP™

## (undated) DEVICE — GLOVE SURG SZ 65 CRM LTX FREE POLYISOPRENE POLYMER BEAD ANTI

## (undated) DEVICE — SOLUTION IV IRRIG 500ML 0.9% SODIUM CHL 2F7123

## (undated) DEVICE — BANDAGE COBAN 4 IN COMPR W4INXL5YD FOAM COHESIVE QUIK STK SELF ADH SFT

## (undated) DEVICE — MASTISOL ADHESIVE LIQ 2/3ML

## (undated) DEVICE — SUTURE PDS II SZ 3-0 L27IN ABSRB UD FS-2 L19MM 3/8 CIR REV Z423H

## (undated) DEVICE — STRIP,CLOSURE,WOUND,MEDI-STRIP,1/2X4: Brand: MEDLINE

## (undated) DEVICE — NEEDLE HYPO 25GA L1.5IN BLU POLYPR HUB S STL REG BVL STR

## (undated) DEVICE — SMALL TEAR CROSS CUT RASP (11.0 X 5.0MM)

## (undated) DEVICE — TUBING, SUCTION, 1/4" X 12', STRAIGHT: Brand: MEDLINE

## (undated) DEVICE — SUTURE PDS II SZ 4-0 L18IN ABSRB UD L19MM PS-2 3/8 CIR PRIM Z496G

## (undated) DEVICE — BLANKET WRM W29.9XL79.1IN UP BODY FORC AIR MISTRAL-AIR

## (undated) DEVICE — SUTURE MCRYL SZ 4-0 L27IN ABSRB UD L19MM PS-2 1/2 CIR PRIM Y426H

## (undated) DEVICE — YANKAUER,FLEXIBLE HANDLE,REGLR CAPACITY: Brand: MEDLINE INDUSTRIES, INC.

## (undated) DEVICE — Device

## (undated) DEVICE — SKIN PREP TRAY W/CHG: Brand: MEDLINE INDUSTRIES, INC.

## (undated) DEVICE — SUTURE PROL SZ 3-0 L18IN NONABSORBABLE BLU L19MM PS-2 3/8 8687H

## (undated) DEVICE — APPLICATOR MEDICATED 26 CC SOLUTION HI LT ORNG CHLORAPREP